# Patient Record
Sex: MALE | Race: WHITE | Employment: FULL TIME | URBAN - METROPOLITAN AREA
[De-identification: names, ages, dates, MRNs, and addresses within clinical notes are randomized per-mention and may not be internally consistent; named-entity substitution may affect disease eponyms.]

---

## 2019-10-21 ENCOUNTER — OFFICE VISIT (OUTPATIENT)
Dept: PRIMARY CARE CLINIC | Age: 24
End: 2019-10-21
Payer: COMMERCIAL

## 2019-10-21 VITALS
TEMPERATURE: 97.5 F | DIASTOLIC BLOOD PRESSURE: 76 MMHG | SYSTOLIC BLOOD PRESSURE: 117 MMHG | WEIGHT: 171.8 LBS | HEART RATE: 73 BPM | BODY MASS INDEX: 24.65 KG/M2

## 2019-10-21 DIAGNOSIS — J20.9 ACUTE BRONCHITIS, UNSPECIFIED ORGANISM: Primary | ICD-10-CM

## 2019-10-21 PROCEDURE — 99202 OFFICE O/P NEW SF 15 MIN: CPT | Performed by: INTERNAL MEDICINE

## 2019-10-21 RX ORDER — AZITHROMYCIN 250 MG/1
TABLET, FILM COATED ORAL
Qty: 1 PACKET | Refills: 0 | Status: SHIPPED | OUTPATIENT
Start: 2019-10-21 | End: 2020-11-28

## 2019-10-21 RX ORDER — METHYLPHENIDATE HYDROCHLORIDE 54 MG/1
54 TABLET ORAL EVERY MORNING
COMMUNITY
Start: 2019-10-21 | End: 2021-06-23 | Stop reason: SDUPTHER

## 2019-10-21 ASSESSMENT — PATIENT HEALTH QUESTIONNAIRE - PHQ9
1. LITTLE INTEREST OR PLEASURE IN DOING THINGS: 0
SUM OF ALL RESPONSES TO PHQ QUESTIONS 1-9: 0
SUM OF ALL RESPONSES TO PHQ9 QUESTIONS 1 & 2: 0
SUM OF ALL RESPONSES TO PHQ QUESTIONS 1-9: 0
2. FEELING DOWN, DEPRESSED OR HOPELESS: 0

## 2019-10-21 ASSESSMENT — ENCOUNTER SYMPTOMS: SORE THROAT: 1

## 2020-03-03 ENCOUNTER — HOSPITAL ENCOUNTER (EMERGENCY)
Age: 25
Discharge: HOME | End: 2020-03-03
Attending: EMERGENCY MEDICINE
Payer: COMMERCIAL

## 2020-03-03 ENCOUNTER — APPOINTMENT (OUTPATIENT)
Dept: GENERAL RADIOLOGY | Age: 25
End: 2020-03-03
Payer: COMMERCIAL

## 2020-03-03 VITALS
WEIGHT: 165 LBS | SYSTOLIC BLOOD PRESSURE: 120 MMHG | RESPIRATION RATE: 19 BRPM | HEART RATE: 98 BPM | TEMPERATURE: 97.6 F | DIASTOLIC BLOOD PRESSURE: 57 MMHG | OXYGEN SATURATION: 100 % | BODY MASS INDEX: 24.44 KG/M2 | HEIGHT: 69 IN

## 2020-03-03 LAB
-: NORMAL
ACETAMINOPHEN LEVEL: <5 UG/ML (ref 10–30)
ALBUMIN SERPL-MCNC: 4.5 G/DL (ref 3.5–5.2)
ALBUMIN/GLOBULIN RATIO: 1.4 (ref 1–2.5)
ALP BLD-CCNC: 68 U/L (ref 40–129)
ALT SERPL-CCNC: 26 U/L (ref 5–41)
AMORPHOUS: NORMAL
AMPHETAMINE SCREEN URINE: NEGATIVE
ANION GAP SERPL CALCULATED.3IONS-SCNC: 20 MMOL/L (ref 9–17)
AST SERPL-CCNC: 20 U/L
BACTERIA: NORMAL
BARBITURATE SCREEN URINE: NEGATIVE
BENZODIAZEPINE SCREEN, URINE: NEGATIVE
BILIRUB SERPL-MCNC: 1.08 MG/DL (ref 0.3–1.2)
BILIRUBIN URINE: NEGATIVE
BUN BLDV-MCNC: 18 MG/DL (ref 6–20)
BUN/CREAT BLD: ABNORMAL (ref 9–20)
BUPRENORPHINE URINE: NORMAL
CALCIUM SERPL-MCNC: 9.6 MG/DL (ref 8.6–10.4)
CANNABINOID SCREEN URINE: NEGATIVE
CASTS UA: NORMAL /LPF (ref 0–8)
CHLORIDE BLD-SCNC: 98 MMOL/L (ref 98–107)
CO2: 20 MMOL/L (ref 20–31)
COCAINE METABOLITE, URINE: NEGATIVE
COLOR: YELLOW
COMMENT UA: ABNORMAL
CREAT SERPL-MCNC: 0.93 MG/DL (ref 0.7–1.2)
CRYSTALS, UA: NORMAL /HPF
EPITHELIAL CELLS UA: NORMAL /HPF (ref 0–5)
ETHANOL PERCENT: <0.01 %
ETHANOL: <10 MG/DL
GFR AFRICAN AMERICAN: >60 ML/MIN
GFR NON-AFRICAN AMERICAN: >60 ML/MIN
GFR SERPL CREATININE-BSD FRML MDRD: ABNORMAL ML/MIN/{1.73_M2}
GFR SERPL CREATININE-BSD FRML MDRD: ABNORMAL ML/MIN/{1.73_M2}
GLUCOSE BLD-MCNC: 105 MG/DL (ref 75–110)
GLUCOSE BLD-MCNC: 126 MG/DL (ref 70–99)
GLUCOSE URINE: NEGATIVE
HCT VFR BLD CALC: 52.4 % (ref 40.7–50.3)
HEMOGLOBIN: 17.9 G/DL (ref 13–17)
KETONES, URINE: ABNORMAL
LEUKOCYTE ESTERASE, URINE: ABNORMAL
MCH RBC QN AUTO: 31 PG (ref 25.2–33.5)
MCHC RBC AUTO-ENTMCNC: 34.2 G/DL (ref 28.4–34.8)
MCV RBC AUTO: 90.7 FL (ref 82.6–102.9)
MDMA URINE: NORMAL
METHADONE SCREEN, URINE: NEGATIVE
METHAMPHETAMINE, URINE: NORMAL
MUCUS: NORMAL
MYOGLOBIN: 24 NG/ML (ref 28–72)
NITRITE, URINE: NEGATIVE
NRBC AUTOMATED: 0 PER 100 WBC
OPIATES, URINE: NEGATIVE
OTHER OBSERVATIONS UA: NORMAL
OXYCODONE SCREEN URINE: NEGATIVE
PDW BLD-RTO: 12.7 % (ref 11.8–14.4)
PH UA: 6 (ref 5–8)
PHENCYCLIDINE, URINE: NEGATIVE
PLATELET # BLD: 328 K/UL (ref 138–453)
PMV BLD AUTO: 9.9 FL (ref 8.1–13.5)
POTASSIUM SERPL-SCNC: 3.8 MMOL/L (ref 3.7–5.3)
PROPOXYPHENE, URINE: NORMAL
PROTEIN UA: NEGATIVE
RBC # BLD: 5.78 M/UL (ref 4.21–5.77)
RBC UA: NORMAL /HPF (ref 0–4)
RENAL EPITHELIAL, UA: NORMAL /HPF
SALICYLATE LEVEL: <1 MG/DL (ref 3–10)
SODIUM BLD-SCNC: 138 MMOL/L (ref 135–144)
SPECIFIC GRAVITY UA: 1.02 (ref 1–1.03)
TEST INFORMATION: NORMAL
TOTAL CK: 53 U/L (ref 39–308)
TOTAL PROTEIN: 7.7 G/DL (ref 6.4–8.3)
TOXIC TRICYCLIC SC,BLOOD: NEGATIVE
TRICHOMONAS: NORMAL
TRICYCLIC ANTIDEPRESSANTS, UR: NORMAL
TROPONIN INTERP: NORMAL
TROPONIN T: NORMAL NG/ML
TROPONIN, HIGH SENSITIVITY: <6 NG/L (ref 0–22)
TURBIDITY: CLEAR
URINE HGB: NEGATIVE
UROBILINOGEN, URINE: NORMAL
WBC # BLD: 15.6 K/UL (ref 3.5–11.3)
WBC UA: NORMAL /HPF (ref 0–5)
YEAST: NORMAL

## 2020-03-03 PROCEDURE — 82947 ASSAY GLUCOSE BLOOD QUANT: CPT

## 2020-03-03 PROCEDURE — 85027 COMPLETE CBC AUTOMATED: CPT

## 2020-03-03 PROCEDURE — 93005 ELECTROCARDIOGRAM TRACING: CPT | Performed by: GENERAL PRACTICE

## 2020-03-03 PROCEDURE — 80307 DRUG TEST PRSMV CHEM ANLYZR: CPT

## 2020-03-03 PROCEDURE — 6370000000 HC RX 637 (ALT 250 FOR IP): Performed by: STUDENT IN AN ORGANIZED HEALTH CARE EDUCATION/TRAINING PROGRAM

## 2020-03-03 PROCEDURE — 82550 ASSAY OF CK (CPK): CPT

## 2020-03-03 PROCEDURE — 81001 URINALYSIS AUTO W/SCOPE: CPT

## 2020-03-03 PROCEDURE — 84484 ASSAY OF TROPONIN QUANT: CPT

## 2020-03-03 PROCEDURE — G0480 DRUG TEST DEF 1-7 CLASSES: HCPCS

## 2020-03-03 PROCEDURE — 83874 ASSAY OF MYOGLOBIN: CPT

## 2020-03-03 PROCEDURE — 99284 EMERGENCY DEPT VISIT MOD MDM: CPT

## 2020-03-03 PROCEDURE — 71045 X-RAY EXAM CHEST 1 VIEW: CPT

## 2020-03-03 PROCEDURE — 2580000003 HC RX 258: Performed by: STUDENT IN AN ORGANIZED HEALTH CARE EDUCATION/TRAINING PROGRAM

## 2020-03-03 PROCEDURE — 2580000003 HC RX 258: Performed by: GENERAL PRACTICE

## 2020-03-03 PROCEDURE — 80053 COMPREHEN METABOLIC PANEL: CPT

## 2020-03-03 RX ORDER — 0.9 % SODIUM CHLORIDE 0.9 %
1000 INTRAVENOUS SOLUTION INTRAVENOUS ONCE
Status: COMPLETED | OUTPATIENT
Start: 2020-03-03 | End: 2020-03-03

## 2020-03-03 RX ORDER — ACETAMINOPHEN 500 MG
1000 TABLET ORAL ONCE
Status: COMPLETED | OUTPATIENT
Start: 2020-03-03 | End: 2020-03-03

## 2020-03-03 RX ADMIN — SODIUM CHLORIDE 1000 ML: 9 INJECTION, SOLUTION INTRAVENOUS at 18:28

## 2020-03-03 RX ADMIN — SODIUM CHLORIDE 1000 ML: 9 INJECTION, SOLUTION INTRAVENOUS at 20:30

## 2020-03-03 RX ADMIN — SODIUM CHLORIDE 1000 ML: 9 INJECTION, SOLUTION INTRAVENOUS at 17:34

## 2020-03-03 RX ADMIN — ACETAMINOPHEN 1000 MG: 500 TABLET ORAL at 20:03

## 2020-03-03 ASSESSMENT — PAIN DESCRIPTION - LOCATION: LOCATION: GENERALIZED

## 2020-03-03 ASSESSMENT — PAIN DESCRIPTION - DESCRIPTORS: DESCRIPTORS: NUMBNESS

## 2020-03-03 ASSESSMENT — ENCOUNTER SYMPTOMS
BLOOD IN STOOL: 0
ABDOMINAL PAIN: 1
BACK PAIN: 1
DIARRHEA: 1
COUGH: 0
VOMITING: 1
SHORTNESS OF BREATH: 1
SORE THROAT: 0
NAUSEA: 1

## 2020-03-03 ASSESSMENT — PAIN SCALES - GENERAL
PAINLEVEL_OUTOF10: 9
PAINLEVEL_OUTOF10: 7

## 2020-03-03 NOTE — ED PROVIDER NOTES
Not on file    Sexual activity: Not on file   Lifestyle    Physical activity:     Days per week: Not on file     Minutes per session: Not on file    Stress: Not on file   Relationships    Social connections:     Talks on phone: Not on file     Gets together: Not on file     Attends Buddhist service: Not on file     Active member of club or organization: Not on file     Attends meetings of clubs or organizations: Not on file     Relationship status: Not on file    Intimate partner violence:     Fear of current or ex partner: Not on file     Emotionally abused: Not on file     Physically abused: Not on file     Forced sexual activity: Not on file   Other Topics Concern    Not on file   Social History Narrative    Not on file       History reviewed. No pertinent family history. Allergies:  Patient has no known allergies. Home Medications:  Prior to Admission medications    Medication Sig Start Date End Date Taking? Authorizing Provider   methylphenidate (CONCERTA) 54 MG extended release tablet Take 54 mg by mouth every morning. 10/21/19 10/21/20  Historical Provider, MD   azithromycin (ZITHROMAX) 250 MG tablet Take 2 tabs (500 mg) on Day 1, and take 1 tab (250 mg) on days 2 through 5. 10/21/19   Hermila Obrien MD       REVIEW OF SYSTEMS    (2-9 systems for level 4, 10 or more for level 5)      Review of Systems   Constitutional: Positive for activity change and chills. Negative for fever. HENT: Negative for congestion, ear pain and sore throat. Respiratory: Positive for shortness of breath. Negative for cough. Gastrointestinal: Positive for abdominal pain, diarrhea, nausea and vomiting. Negative for blood in stool. Genitourinary: Negative for difficulty urinating, dysuria, enuresis and hematuria. Musculoskeletal: Positive for back pain and gait problem. Skin: Negative for rash and wound. Neurological: Positive for weakness and numbness.  Negative for tremors, seizures, speech difficulty TRICYCLIC SC,B    CK    Microscopic Urinalysis    POC CHEMISTRY (NA,K,ICA,GLU,CALC HCT/HGB,LACTATE,CREA,CL)    POC Glucose Fingerstick    EKG 12 Lead       MEDICATIONS ORDERED:  Orders Placed This Encounter   Medications    0.9 % sodium chloride bolus    0.9 % sodium chloride bolus    acetaminophen (TYLENOL) tablet 1,000 mg    0.9 % sodium chloride bolus       DDX: Electrolyte abnormality, Guyon Barré, viral illness, supplement overdose, rhabdomyolysis    DIAGNOSTIC RESULTS / EMERGENCY DEPARTMENT COURSE / MDM   :  Results for orders placed or performed during the hospital encounter of 03/03/20   CBC   Result Value Ref Range    WBC 15.6 (H) 3.5 - 11.3 k/uL    RBC 5.78 (H) 4.21 - 5.77 m/uL    Hemoglobin 17.9 (H) 13.0 - 17.0 g/dL    Hematocrit 52.4 (H) 40.7 - 50.3 %    MCV 90.7 82.6 - 102.9 fL    MCH 31.0 25.2 - 33.5 pg    MCHC 34.2 28.4 - 34.8 g/dL    RDW 12.7 11.8 - 14.4 %    Platelets 929 346 - 034 k/uL    MPV 9.9 8.1 - 13.5 fL    NRBC Automated 0.0 0.0 per 100 WBC   Comprehensive Metabolic Panel   Result Value Ref Range    Glucose 126 (H) 70 - 99 mg/dL    BUN 18 6 - 20 mg/dL    CREATININE 0.93 0.70 - 1.20 mg/dL    Bun/Cre Ratio NOT REPORTED 9 - 20    Calcium 9.6 8.6 - 10.4 mg/dL    Sodium 138 135 - 144 mmol/L    Potassium 3.8 3.7 - 5.3 mmol/L    Chloride 98 98 - 107 mmol/L    CO2 20 20 - 31 mmol/L    Anion Gap 20 (H) 9 - 17 mmol/L    Alkaline Phosphatase 68 40 - 129 U/L    ALT 26 5 - 41 U/L    AST 20 <40 U/L    Total Bilirubin 1.08 0.3 - 1.2 mg/dL    Total Protein 7.7 6.4 - 8.3 g/dL    Alb 4.5 3.5 - 5.2 g/dL    Albumin/Globulin Ratio 1.4 1.0 - 2.5    GFR Non-African American >60 >60 mL/min    GFR African American >60 >60 mL/min    GFR Comment          GFR Staging NOT REPORTED    Urinalysis   Result Value Ref Range    Color, UA YELLOW YELLOW    Turbidity UA CLEAR CLEAR    Glucose, Ur NEGATIVE NEGATIVE    Bilirubin Urine NEGATIVE NEGATIVE    Ketones, Urine TRACE (A) NEGATIVE    Specific [place], UA 1.022 1.005 - 1.030    Urine Hgb NEGATIVE NEGATIVE    pH, UA 6.0 5.0 - 8.0    Protein, UA NEGATIVE NEGATIVE    Urobilinogen, Urine Normal Normal    Nitrite, Urine NEGATIVE NEGATIVE    Leukocyte Esterase, Urine TRACE (A) NEGATIVE    Urinalysis Comments NOT REPORTED    MYOGLOBIN, SERUM   Result Value Ref Range    Myoglobin 24 (L) 28 - 72 ng/mL   Troponin   Result Value Ref Range    Troponin, High Sensitivity <6 0 - 22 ng/L    Troponin T NOT REPORTED <0.03 ng/mL    Troponin Interp NOT REPORTED    Urine Drug Screen   Result Value Ref Range    Amphetamine Screen, Ur NEGATIVE NEGATIVE    Barbiturate Screen, Ur NEGATIVE NEGATIVE    Benzodiazepine Screen, Urine NEGATIVE NEGATIVE    Cocaine Metabolite, Urine NEGATIVE NEGATIVE    Methadone Screen, Urine NEGATIVE NEGATIVE    Opiates, Urine NEGATIVE NEGATIVE    Phencyclidine, Urine NEGATIVE NEGATIVE    Propoxyphene, Urine NOT REPORTED NEGATIVE    Cannabinoid Scrn, Ur NEGATIVE NEGATIVE    Oxycodone Screen, Ur NEGATIVE NEGATIVE    Methamphetamine, Urine NOT REPORTED NEGATIVE    Tricyclic Antidepressants, Urine NOT REPORTED NEGATIVE    MDMA, Urine NOT REPORTED NEGATIVE    Buprenorphine Urine NOT REPORTED NEGATIVE    Test Information       Assay provides medical screening only. The absence of expected drug(s) and/or metabolite(s) may indicate diluted or adulterated urine, limitations of testing or timing of collection.    Acetaminophen Level   Result Value Ref Range    Acetaminophen Level <5 (L) 10 - 30 ug/mL   Ethanol   Result Value Ref Range    Ethanol <10 <10 mg/dL    Ethanol percent <7.514 <8.986 %   Salicylate   Result Value Ref Range    Salicylate Lvl <1 (L) 3 - 10 mg/dL   TOXIC TRICYCLIC SC,B   Result Value Ref Range    Toxic Tricyclic Sc,Blood NEGATIVE NEGATIVE   CK   Result Value Ref Range    Total CK 53 39 - 308 U/L   Microscopic Urinalysis   Result Value Ref Range    -          WBC, UA 0 TO 2 0 - 5 /HPF    RBC, UA 0 TO 2 0 - 4 /HPF    Casts UA  0 - 8 /LPF     2 TO 5 HYALINE Reference range defined for non-centrifuged specimen. Crystals, UA NOT REPORTED None /HPF    Epithelial Cells UA None 0 - 5 /HPF    Renal Epithelial, UA NOT REPORTED 0 /HPF    Bacteria, UA NOT REPORTED None    Mucus, UA NOT REPORTED None    Trichomonas, UA NOT REPORTED None    Amorphous, UA NOT REPORTED None    Other Observations UA NOT REPORTED NOT REQ. Yeast, UA NOT REPORTED None   POC Glucose Fingerstick   Result Value Ref Range    POC Glucose 105 75 - 110 mg/dL   EKG 12 Lead   Result Value Ref Range    Ventricular Rate 102 BPM    Atrial Rate 102 BPM    P-R Interval 120 ms    QRS Duration 102 ms    Q-T Interval 336 ms    QTc Calculation (Bazett) 437 ms    P Axis 58 degrees    R Axis 35 degrees    T Axis 12 degrees           RADIOLOGY:  None    EKG  EKG Interpretation    Interpreted by emergency department physician    Rhythm: sinus tachycardia  Rate: tachycardia  Axis: normal  Ectopy: none  Conduction: normal  ST Segments: no acute change  T Waves: normal  Q Waves: none    Clinical Impression: no acute changes    Fadi Hernandez      All EKG's are interpreted by the Emergency Department Physician who either signs or Co-signs this chart in the absence of a cardiologist.    EMERGENCY DEPARTMENT COURSE/IMPRESSION:  73-year-old male brought in for abdominal pain, difficulty walking, numbness and tingling all over, shortness of breath. On exam patient is diaphoretic, hands bilaterally are in a spasm, patient states he does workout every day and takes supplements, patient states he has been having coffee-ground emesis and dark stool which he is concerned he had blood in it, stool guaiac was performed and was negative. Patient started feeling slightly better after liter of fluid, patient redosed with another liter, heart rate responded decreased, patient denies any current nausea or vomiting.     Patient was signed out to oncoming resident pending fluid bolus and remainder of labs.    PROCEDURES:  None    CONSULTS:  None    CRITICAL CARE:  None    FINAL IMPRESSION      1.  Dehydration          DISPOSITION / PLAN     DISPOSITION        PATIENT REFERRED TO:  [facility]  [address] [facility] [address]  [phone]    If symptoms worsen    Coleman Arias MD  [address] [address] [address] [place] [address]  [phone]    Schedule an appointment as soon as possible for a visit in 2 days        DISCHARGE MEDICATIONS:  Discharge Medication List as of 3/3/2020 10:18 PM          Rachael Flores DO  Emergency Medicine Resident    (Please note that portions of thisnote were completed with a voice recognition program.  Efforts were made to edit the dictations but occasionally words are mis-transcribed.)     Rachael Flores DO  Resident  03/04/20 7103

## 2020-03-03 NOTE — ED NOTES
Pt to ED from walk in clinic. Pt states he has been having coffee ground emesis as well as dark stools. Pt has diffuse weakness and states his whole body feels numb. Pt was wheeled from triage d/t not being able to walk. Pt is had rapid, shallow breathing and is pale and diaphoretic. Pt denies recreational drug use. States he uses workout supplements including HGH. Dr. Carlynn Scheuermann at bedside to evaluate patient. Pt placed on full cardiac monitor, IV established, Labs drawn. Awaiting further orders.       Den Penaloza, VASYL  03/03/20 [address], VASYL  03/03/20 8936

## 2020-03-04 LAB
EKG ATRIAL RATE: 102 BPM
EKG P AXIS: 58 DEGREES
EKG P-R INTERVAL: 120 MS
EKG Q-T INTERVAL: 336 MS
EKG QRS DURATION: 102 MS
EKG QTC CALCULATION (BAZETT): 437 MS
EKG R AXIS: 35 DEGREES
EKG T AXIS: 12 DEGREES
EKG VENTRICULAR RATE: 102 BPM

## 2020-03-04 PROCEDURE — 93010 ELECTROCARDIOGRAM REPORT: CPT | Performed by: INTERNAL MEDICINE

## 2020-03-04 NOTE — ED PROVIDER NOTES
sob TECHNOLOGIST PROVIDED HISTORY: sob Reason for Exam: upr,weakness,emesis FINDINGS: The lungs are without acute focal process. There is no effusion or pneumothorax. The cardiomediastinal silhouette is without acute process. The osseous structures are without acute process. No acute process. RECENT VITALS:     Temp: 97.6 °F (36.4 °C),  Pulse: 98, Resp: 19, BP: (!) 120/57, SpO2: 100 %    This patient is a 23 y.o. Male with diffuse myalgias, dehydration, generalized weakness. Patient reports significant exertion with working out recently. Patient appears to be dehydrated on lab work. Patient received 3 L normal saline, tolerated p.o. intake, vital signs normalized patient reported improvement of symptoms. Patient will need for discharge home, discussed return precautions with patient as well as patient's mother including worsening diffuse myalgias, darkening of urine, fevers chills, or worsening weakness. Ck was obtained and normal range. OUTSTANDING TASKS / RECOMMENDATIONS:    1.       FINAL IMPRESSION:     1. Dehydration        DISPOSITION:         DISPOSITION:  []  Discharge   []  Transfer -    []  Admission -     []  Against Medical Advice   []  Eloped   FOLLOW-UP: [facility]  [address] [facility]  [phone]    If symptoms worsen    Stephani Roman MD  [facility] [address]  [phone]    Schedule an appointment as soon as possible for a visit in 2 days       DISCHARGE MEDICATIONS: New Prescriptions    No medications on file          Ferdinand Zhu DO  Emergency Medicine Resident  [place], [place]  03/03/20 9414

## 2020-11-28 ENCOUNTER — OFFICE VISIT (OUTPATIENT)
Dept: FAMILY MEDICINE CLINIC | Age: 25
End: 2020-11-28
Payer: COMMERCIAL

## 2020-11-28 VITALS
BODY MASS INDEX: 23.7 KG/M2 | HEIGHT: 69 IN | OXYGEN SATURATION: 99 % | TEMPERATURE: 98.8 F | SYSTOLIC BLOOD PRESSURE: 123 MMHG | DIASTOLIC BLOOD PRESSURE: 83 MMHG | HEART RATE: 72 BPM | WEIGHT: 160 LBS

## 2020-11-28 PROCEDURE — 90715 TDAP VACCINE 7 YRS/> IM: CPT | Performed by: NURSE PRACTITIONER

## 2020-11-28 PROCEDURE — 90471 IMMUNIZATION ADMIN: CPT | Performed by: NURSE PRACTITIONER

## 2020-11-28 PROCEDURE — 99213 OFFICE O/P EST LOW 20 MIN: CPT | Performed by: NURSE PRACTITIONER

## 2020-11-28 RX ORDER — DOXYCYCLINE HYCLATE 100 MG/1
100 CAPSULE ORAL 2 TIMES DAILY
Qty: 14 CAPSULE | Refills: 0 | Status: SHIPPED | OUTPATIENT
Start: 2020-11-28 | End: 2020-12-05

## 2020-11-28 RX ORDER — DEXMETHYLPHENIDATE HYDROCHLORIDE 10 MG/1
20 TABLET ORAL
COMMUNITY
Start: 2020-10-22 | End: 2021-08-16 | Stop reason: SDUPTHER

## 2020-11-28 ASSESSMENT — PATIENT HEALTH QUESTIONNAIRE - PHQ9
SUM OF ALL RESPONSES TO PHQ QUESTIONS 1-9: 0
2. FEELING DOWN, DEPRESSED OR HOPELESS: 0
1. LITTLE INTEREST OR PLEASURE IN DOING THINGS: 0
SUM OF ALL RESPONSES TO PHQ QUESTIONS 1-9: 0
SUM OF ALL RESPONSES TO PHQ9 QUESTIONS 1 & 2: 0
SUM OF ALL RESPONSES TO PHQ QUESTIONS 1-9: 0

## 2020-11-28 ASSESSMENT — ENCOUNTER SYMPTOMS
SHORTNESS OF BREATH: 0
COUGH: 0
WHEEZING: 0
CHEST TIGHTNESS: 0
RESPIRATORY NEGATIVE: 1

## 2020-11-28 NOTE — PROGRESS NOTES
After obtaining consent, and per orders of [facility], injection of Tdap given in Left deltoid by Oscar Gray. Patient instructed to remain in clinic for 20 minutes afterwards, and to report any adverse reaction to me immediately.

## 2020-11-28 NOTE — PROGRESS NOTES
[address] [facility]  [address] [address]  Dept: [phone]  Dept Fax: [phone]     Renetta Mendez is a 23 y.o. male who presents to the urgent care today for his medicalconditions/complaints as noted below. Renetta Mendez is c/o of Other (stepped on nail two days ago )    HPI:      Wound Check   He was originally treated 2 to 3 days ago. Previous treatment included wound cleansing or irrigation. Maximum temperature: afebrile. There has been no drainage from the wound. There is no redness present. The swelling has not changed. The pain has not changed. Step on a nail 2-3 days ago and states that he would like to have it checked. Does not believe he is up to date on his tetanus shot. No past medical history on file. Current Outpatient Medications   Medication Sig Dispense Refill    doxycycline hyclate (VIBRAMYCIN) 100 MG capsule Take 1 capsule by mouth 2 times daily for 7 days 14 capsule 0    dexmethylphenidate (FOCALIN) 10 MG tablet       methylphenidate (CONCERTA) 54 MG extended release tablet Take 54 mg by mouth every morning. No current facility-administered medications for this visit. No Known Allergies    Reviewed PMH, SH, and FH with the patient and updated. Subjective:      Review of Systems   Constitutional: Negative for chills, fatigue and fever. Respiratory: Negative. Negative for cough, chest tightness, shortness of breath and wheezing. Cardiovascular: Negative. Negative for chest pain. Musculoskeletal: Positive for arthralgias (left foot puncture wound) and gait problem (pain when bearing weight on left foot). Skin: Positive for wound (puncture wound, left foot). Negative for rash. All other systems reviewed and are negative. Objective:      Physical Exam  Vitals signs and nursing note reviewed. Constitutional:       General: He is not in acute distress.      Appearance: He is well-developed. He is not diaphoretic. HENT:      Head: Normocephalic and atraumatic. Cardiovascular:      Rate and Rhythm: Normal rate and regular rhythm. Heart sounds: Normal heart sounds. No murmur. Pulmonary:      Effort: Pulmonary effort is normal. No respiratory distress. Breath sounds: Normal breath sounds. No wheezing. Musculoskeletal:      Left foot: Normal range of motion. Tenderness and swelling (trace, over puncture site) present. No bony tenderness. Feet:    Skin:     General: Skin is warm and dry. Findings: Wound (puncture wound left foot) present. Neurological:      Mental Status: He is alert. /83 (Site: Left Upper Arm, Position: Sitting, Cuff Size: Medium Adult)   Pulse 72   Temp 98.8 °F (37.1 °C) (Temporal)   Ht 5' 9\" (1.753 m)   Wt 160 lb (72.6 kg)   SpO2 99%   BMI 23.63 kg/m²     Assessment:       Diagnosis Orders   1. Puncture wound  Tdap (age 6y and older) IM (BOOSTRIX)    doxycycline hyclate (VIBRAMYCIN) 100 MG capsule     Plan:      Offered XR, patient declined. Patient instructed to complete antibiotic prescription fully. Tetanus immunization found to be necessary at this time. Td booster administered in the office. Patient tolerated well. May use Motrin/Tylenol for fever/pain. Educational materials provided on AVS.  Follow up if symptoms do not improve/worsen. Orders Placed This Encounter   Medications    doxycycline hyclate (VIBRAMYCIN) 100 MG capsule     Sig: Take 1 capsule by mouth 2 times daily for 7 days     Dispense:  14 capsule     Refill:  0        Patient given educational materials - see patient instructions. Discussed use, benefit, and side effects of prescribed medications. All patientquestions answered. Pt voiced understanding.     Electronically signed by BREE Hidalgo CNP on 11/28/2020at 10:51 AM

## 2021-04-06 ENCOUNTER — OFFICE VISIT (OUTPATIENT)
Dept: PRIMARY CARE CLINIC | Age: 26
End: 2021-04-06
Payer: COMMERCIAL

## 2021-04-06 VITALS
SYSTOLIC BLOOD PRESSURE: 132 MMHG | DIASTOLIC BLOOD PRESSURE: 84 MMHG | HEART RATE: 84 BPM | BODY MASS INDEX: 25.84 KG/M2 | TEMPERATURE: 97.9 F | WEIGHT: 175 LBS | OXYGEN SATURATION: 98 %

## 2021-04-06 DIAGNOSIS — F90.0 ADHD (ATTENTION DEFICIT HYPERACTIVITY DISORDER), INATTENTIVE TYPE: ICD-10-CM

## 2021-04-06 PROCEDURE — 99203 OFFICE O/P NEW LOW 30 MIN: CPT | Performed by: NURSE PRACTITIONER

## 2021-04-06 SDOH — ECONOMIC STABILITY: INCOME INSECURITY: HOW HARD IS IT FOR YOU TO PAY FOR THE VERY BASICS LIKE FOOD, HOUSING, MEDICAL CARE, AND HEATING?: NOT HARD AT ALL

## 2021-04-06 SDOH — ECONOMIC STABILITY: FOOD INSECURITY: WITHIN THE PAST 12 MONTHS, THE FOOD YOU BOUGHT JUST DIDN'T LAST AND YOU DIDN'T HAVE MONEY TO GET MORE.: NEVER TRUE

## 2021-04-06 ASSESSMENT — PATIENT HEALTH QUESTIONNAIRE - PHQ9
SUM OF ALL RESPONSES TO PHQ QUESTIONS 1-9: 0
2. FEELING DOWN, DEPRESSED OR HOPELESS: 0
SUM OF ALL RESPONSES TO PHQ9 QUESTIONS 1 & 2: 0
1. LITTLE INTEREST OR PLEASURE IN DOING THINGS: 0
SUM OF ALL RESPONSES TO PHQ QUESTIONS 1-9: 0

## 2021-04-06 ASSESSMENT — ENCOUNTER SYMPTOMS
BLOOD IN STOOL: 0
DIARRHEA: 0
VOMITING: 0
WHEEZING: 0
SHORTNESS OF BREATH: 0
TROUBLE SWALLOWING: 0
ABDOMINAL PAIN: 0

## 2021-04-06 NOTE — PROGRESS NOTES
neck pain. Skin: Negative for rash. Neurological: Negative for dizziness, seizures, syncope, numbness and headaches. Psychiatric/Behavioral: Positive for decreased concentration. Negative for agitation, dysphoric mood, sleep disturbance and suicidal ideas. The patient is not nervous/anxious. Prior to Visit Medications    Medication Sig Taking? Authorizing Provider   dexmethylphenidate (FOCALIN) 10 MG tablet 20 mg. Yes Historical Provider, MD   methylphenidate (CONCERTA) 54 MG extended release tablet Take 54 mg by mouth every morning. Yes Historical Provider, MD        Social History     Tobacco Use    Smoking status: Never Smoker    Smokeless tobacco: Never Used   Substance Use Topics    Alcohol use: Not on file        Vitals:    04/06/21 1029   BP: 132/84   Site: Left Upper Arm   Position: Sitting   Cuff Size: Medium Adult   Pulse: 84   Temp: 97.9 °F (36.6 °C)   SpO2: 98%   Weight: 175 lb (79.4 kg)     Estimated body mass index is 25.84 kg/m² as calculated from the following:    Height as of 11/28/20: 5' 9\" (1.753 m). Weight as of this encounter: 175 lb (79.4 kg). DIAGNOSTIC FINDINGS:  CBC:  Lab Results   Component Value Date    WBC 15.6 03/03/2020    HGB 17.9 03/03/2020     03/03/2020     01/11/2012       BMP:    Lab Results   Component Value Date     03/03/2020    K 3.8 03/03/2020    CL 98 03/03/2020    CO2 20 03/03/2020    BUN 18 03/03/2020    CREATININE 0.93 03/03/2020    GLUCOSE 126 03/03/2020    GLUCOSE 101 01/11/2012       HEMOGLOBIN A1C: No results found for: LABA1C    FASTING LIPID PANEL:No results found for: CHOL, HDL, TRIG    Physical Exam  Vitals signs and nursing note reviewed. Constitutional:       General: He is not in acute distress. Appearance: Normal appearance. He is well-developed. He is not ill-appearing. HENT:      Head: Normocephalic. Eyes:      General: No scleral icterus. Pupils: Pupils are equal, round, and reactive to light.    Neck: Return in about 3 months (around 7/6/2021) for ADHD. An electronic signature was used to authenticate this note.     --Hudson Mckoy, BREE - CNP on 4/6/2021 at 11:53 AM

## 2021-05-28 ENCOUNTER — OFFICE VISIT (OUTPATIENT)
Dept: PRIMARY CARE CLINIC | Age: 26
End: 2021-05-28
Payer: COMMERCIAL

## 2021-05-28 VITALS
TEMPERATURE: 98.4 F | HEART RATE: 74 BPM | OXYGEN SATURATION: 97 % | SYSTOLIC BLOOD PRESSURE: 123 MMHG | DIASTOLIC BLOOD PRESSURE: 76 MMHG

## 2021-05-28 DIAGNOSIS — L55.9 SUNBURN: Primary | ICD-10-CM

## 2021-05-28 PROCEDURE — 99213 OFFICE O/P EST LOW 20 MIN: CPT | Performed by: INTERNAL MEDICINE

## 2021-05-28 RX ORDER — MOMETASONE FUROATE 1 MG/G
CREAM TOPICAL
Qty: 45 G | Refills: 0 | Status: SHIPPED | OUTPATIENT
Start: 2021-05-28 | End: 2022-08-29 | Stop reason: SDUPTHER

## 2021-05-28 ASSESSMENT — ENCOUNTER SYMPTOMS: BURN: 1
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
## 2021-05-28 NOTE — PROGRESS NOTES
Visit Information    Have you changed or started any medications since your last visit including any over-the-counter medicines, vitamins, or herbal medicines? no   Are you having any side effects from any of your medications? -  no  Have you stopped taking any of your medications? Is so, why? -  no    Have you seen any other physician or provider since your last visit? No  Have you had any other diagnostic tests since your last visit? No  Have you been seen in the emergency room and/or had an admission to a hospital since we last saw you? No  Have you had your routine dental cleaning in the past 6 months? no    Have you activated your [employer] account? If not, what are your barriers?  No:      Patient Care Team:  BREE Sheets CNP as PCP - General (Family Medicine)  BREE Sheets CNP as PCP - [facility] Provider    Medical History Review  Past Medical, Family, and Social History reviewed and does not contribute to the patient presenting condition    Health Maintenance   Topic Date Due    Hepatitis C screen  Never done    Varicella vaccine (1 of 2 - 2-dose childhood series) Never done    HPV vaccine (1 - Male 2-dose series) Never done    COVID-19 Vaccine (1) Never done    HIV screen  Never done    Flu vaccine (Season Ended) 09/01/2021    DTaP/Tdap/Td vaccine (2 - Td) 11/28/2030    Hepatitis A vaccine  Aged Out    Hepatitis B vaccine  Aged Out    Hib vaccine  Aged Out    Meningococcal (ACWY) vaccine  Aged Out    Pneumococcal 0-64 years Vaccine  Aged Out

## 2021-06-23 ENCOUNTER — OFFICE VISIT (OUTPATIENT)
Dept: PRIMARY CARE CLINIC | Age: 26
End: 2021-06-23
Payer: COMMERCIAL

## 2021-06-23 VITALS
TEMPERATURE: 98.4 F | OXYGEN SATURATION: 95 % | DIASTOLIC BLOOD PRESSURE: 74 MMHG | SYSTOLIC BLOOD PRESSURE: 124 MMHG | WEIGHT: 174 LBS | HEART RATE: 89 BPM | BODY MASS INDEX: 25.7 KG/M2

## 2021-06-23 DIAGNOSIS — F90.0 ADHD (ATTENTION DEFICIT HYPERACTIVITY DISORDER), INATTENTIVE TYPE: Primary | ICD-10-CM

## 2021-06-23 PROCEDURE — 99213 OFFICE O/P EST LOW 20 MIN: CPT | Performed by: NURSE PRACTITIONER

## 2021-06-23 RX ORDER — METHYLPHENIDATE HYDROCHLORIDE 54 MG/1
54 TABLET ORAL EVERY MORNING
Qty: 30 TABLET | Refills: 0 | Status: SHIPPED | OUTPATIENT
Start: 2021-06-23 | End: 2021-08-16 | Stop reason: SDUPTHER

## 2021-06-23 ASSESSMENT — PATIENT HEALTH QUESTIONNAIRE - PHQ9
SUM OF ALL RESPONSES TO PHQ QUESTIONS 1-9: 0
2. FEELING DOWN, DEPRESSED OR HOPELESS: 0
1. LITTLE INTEREST OR PLEASURE IN DOING THINGS: 0
SUM OF ALL RESPONSES TO PHQ9 QUESTIONS 1 & 2: 0

## 2021-06-23 ASSESSMENT — ENCOUNTER SYMPTOMS
ABDOMINAL PAIN: 0
DIARRHEA: 0
VOMITING: 0
TROUBLE SWALLOWING: 0
WHEEZING: 0
BLOOD IN STOOL: 0
SHORTNESS OF BREATH: 0

## 2021-06-23 NOTE — PROGRESS NOTES
[facility]  [address] [facility] [address]  Dept: [phone]  Dept Fax: [phone]    Patient Care Team:  BREE Mcdermott CNP as PCP - General (Family Medicine)  BREE Mcdermott CNP as PCP - [facility] Empaneled Provider    2021     Crys Noguera (:  1995)is a 27 y.o. male, here for evaluation of the following medical concerns:   Chief Complaint   Patient presents with    3 Month Follow-Up     ADHD       Crys Noguera is a 79-year-old male here today for ADHD f/u. He endorses a past medical history of ADHD only. Taking Concerta daily and Focalin used as needed for study purposes. 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 He is still also working at [employer]. Diagnosed as a Freshman in high school, [address] [place] [address] sent him to their counselor and was sent to a [place].  He admits he always had trouble in school, difficulty with grades. Is night and day on meds, trouble focusing. Also admits to lack ambition when meds wear off at the end of the day. He denies any palpitations or dizziness. He states he will have nausea if he does not eat with his medication. No difficulty falling asleep. .    Review of Systems   Constitutional: Negative for appetite change, fever and unexpected weight change. HENT: Negative for hearing loss and trouble swallowing. Eyes: Negative for visual disturbance. Respiratory: Negative for shortness of breath and wheezing. Cardiovascular: Negative for chest pain and palpitations. Gastrointestinal: Negative for abdominal pain, blood in stool, diarrhea and vomiting. Endocrine: Negative for polydipsia and polyuria. Genitourinary: Negative for difficulty urinating, frequency, hematuria and testicular pain. Musculoskeletal: Negative for myalgias and neck pain. Skin: Negative for rash.    Neurological: Negative for dizziness, seizures, syncope, numbness and headaches. Psychiatric/Behavioral: Positive for decreased concentration. Negative for agitation, dysphoric mood, sleep disturbance and suicidal ideas. The patient is not nervous/anxious. Prior to Visit Medications    Medication Sig Taking? Authorizing Provider   methylphenidate (CONCERTA) 54 MG extended release tablet Take 1 tablet by mouth every morning. Yes BREE Terrazas CNP   mometasone (ELOCON) 0.1 % cream Apply topically daily. Yes Ana Pa MD   dexmethylphenidate (FOCALIN) 10 MG tablet 20 mg. Yes Historical Provider, MD        Social History     Tobacco Use    Smoking status: Never Smoker    Smokeless tobacco: Never Used   Substance Use Topics    Alcohol use: Not on file        Vitals:    06/23/21 1511   BP: 124/74   Site: Left Upper Arm   Position: Sitting   Cuff Size: Medium Adult   Pulse: 89   Temp: 98.4 °F (36.9 °C)   TempSrc: Temporal   SpO2: 95%   Weight: 174 lb (78.9 kg)     Estimated body mass index is 25.7 kg/m² as calculated from the following:    Height as of 11/28/20: 5' 9\" (1.753 m). Weight as of this encounter: 174 lb (78.9 kg). DIAGNOSTIC FINDINGS:  CBC:  Lab Results   Component Value Date    WBC 15.6 03/03/2020    HGB 17.9 03/03/2020     03/03/2020     01/11/2012       BMP:    Lab Results   Component Value Date     03/03/2020    K 3.8 03/03/2020    CL 98 03/03/2020    CO2 20 03/03/2020    BUN 18 03/03/2020    CREATININE 0.93 03/03/2020    GLUCOSE 126 03/03/2020    GLUCOSE 101 01/11/2012       HEMOGLOBIN A1C: No results found for: LABA1C    FASTING LIPID PANEL:No results found for: CHOL, HDL, TRIG    Physical Exam  Vitals and nursing note reviewed. Constitutional:       General: He is not in acute distress. Appearance: Normal appearance. He is well-developed. He is not ill-appearing. HENT:      Head: Normocephalic. Eyes:      General: No scleral icterus.      Pupils: Pupils are equal, round, and reactive to light. Cardiovascular:      Rate and Rhythm: Normal rate and regular rhythm. Heart sounds: No murmur heard. Pulmonary:      Effort: Pulmonary effort is normal.      Breath sounds: Normal breath sounds. Abdominal:      General: Abdomen is flat. Palpations: Abdomen is soft. Musculoskeletal:      Cervical back: Normal range of motion and neck supple. Skin:     General: Skin is warm and dry. Neurological:      General: No focal deficit present. Mental Status: He is alert and oriented to person, place, and time. Coordination: Coordination normal.   Psychiatric:         Behavior: Behavior normal. Behavior is cooperative. Thought Content: Thought content normal.         Judgment: Judgment normal.         ASSESSMENT     Diagnosis Orders   1. ADHD (attention deficit hyperactivity disorder), inattentive type  methylphenidate (CONCERTA) 54 MG extended release tablet          PLAN:  Orders Placed This Encounter   Medications    methylphenidate (CONCERTA) 54 MG extended release tablet     Sig: Take 1 tablet by mouth every morning. Dispense:  30 tablet     Refill:  0         1. OARRS report reviewed, no concerns with aberrant use. Main stable, blood pressure at goal.  No tachycardia. He may continue methylphenidate daily. Currently denies any need for refills of Focalin, still has 10 tablets left from last prescription    FOLLOW UP AND INSTRUCTIONS:  Return in about 3 months (around 9/23/2021) for ADHD. · Merrick Canas received counseling on the following healthy behaviors:medication adherence    · Discussed use, benefit, and side effects of prescribed medications. Barriers to  medication compliance addressed. All patient questions answered. Pt  verbalized understanding of all instructions given.     · Patient given educational materials - see patient instructions      · Patient advised to contact scheduling offices for any referrals or imaging orders  placed today if they have not been contacted in 48 hours. Return in about 3 months (around 9/23/2021) for ADHD. An electronic signature was used to authenticate this note. --BREE Thibodeaux CNP on 6/23/2021 at 4:41 PM  Visit Information    Have you changed or started any medications since your last visit including any over-the-counter medicines, vitamins, or herbal medicines? no   Are you having any side effects from any of your medications? -  no  Have you stopped taking any of your medications? Is so, why? -  no    Have you seen any other physician or provider since your last visit? No  Have you had any other diagnostic tests since your last visit? No  Have you been seen in the emergency room and/or had an admission to a hospital since we last saw you? No  Have you had your routine dental cleaning in the past 6 months? no    Have you activated your [employer] account? If not, what are your barriers?  No: declined     Patient Care Team:  BREE Thibodeaux CNP as PCP - General (Family Medicine)  BREE Thibodeaux CNP as PCP - [facility] Empaneled Provider    Medical History Review  Past Medical, Family, and Social History reviewed and does not contribute to the patient presenting condition    Health Maintenance   Topic Date Due    Hepatitis C screen  Never done    Varicella vaccine (1 of 2 - 2-dose childhood series) Never done    HPV vaccine (1 - Male 2-dose series) Never done    HIV screen  Never done    Flu vaccine (Season Ended) 09/01/2021    DTaP/Tdap/Td vaccine (2 - Td or Tdap) 11/28/2030    COVID-19 Vaccine  Completed    Hepatitis A vaccine  Aged Out    Hepatitis B vaccine  Aged Out    Hib vaccine  Aged Out    Meningococcal (ACWY) vaccine  Aged Out    Pneumococcal 0-64 years Vaccine  Aged Out

## 2021-08-16 ENCOUNTER — TELEPHONE (OUTPATIENT)
Dept: PRIMARY CARE CLINIC | Age: 26
End: 2021-08-16

## 2021-08-16 DIAGNOSIS — F90.0 ADHD (ATTENTION DEFICIT HYPERACTIVITY DISORDER), INATTENTIVE TYPE: ICD-10-CM

## 2021-08-16 RX ORDER — DEXMETHYLPHENIDATE HYDROCHLORIDE 10 MG/1
10 TABLET ORAL DAILY PRN
Qty: 20 TABLET | Refills: 0 | Status: SHIPPED | OUTPATIENT
Start: 2021-08-16 | End: 2022-05-31 | Stop reason: SDUPTHER

## 2021-08-16 RX ORDER — METHYLPHENIDATE HYDROCHLORIDE 54 MG/1
54 TABLET ORAL EVERY MORNING
Qty: 30 TABLET | Refills: 0 | Status: SHIPPED | OUTPATIENT
Start: 2021-08-16 | End: 2021-09-29 | Stop reason: SDUPTHER

## 2021-08-16 NOTE — TELEPHONE ENCOUNTER
Patient is requesting a med refill on methylphenidate 54 mg and dexmethylphenidate 10 mg, patient has upcoming appt on 9/29 with pcp  Health Maintenance   Topic Date Due    Hepatitis C screen  Never done    Varicella vaccine (1 of 2 - 2-dose childhood series) Never done    HPV vaccine (1 - Male 2-dose series) Never done    HIV screen  Never done    Flu vaccine (1) 09/01/2021    DTaP/Tdap/Td vaccine (2 - Td or Tdap) 11/28/2030    COVID-19 Vaccine  Completed    Hepatitis A vaccine  Aged Out    Hepatitis B vaccine  Aged Out    Hib vaccine  Aged Out    Meningococcal (ACWY) vaccine  Aged Out    Pneumococcal 0-64 years Vaccine  Aged Out             (applicable per patient's age: Cancer Screenings, Depression Screening, Fall Risk Screening, Immunizations)    AST (U/L)   Date Value   03/03/2020 20     ALT (U/L)   Date Value   03/03/2020 26     BUN (mg/dL)   Date Value   03/03/2020 18      (goal A1C is < 7)   (goal LDL is <100) need 30-50% reduction from baseline     BP Readings from Last 3 Encounters:   06/23/21 124/74   05/28/21 123/76   04/06/21 132/84    (goal /80)      All Future Testing planned in [facility]:      Next Visit Date:  Future Appointments   Date Time Provider Alejandro Keith   9/29/2021  3:15 PM Emeli Le, APRN - [address] [facility]            Patient Active Problem List:     ADHD (attention deficit hyperactivity disorder), inattentive type

## 2021-09-29 ENCOUNTER — OFFICE VISIT (OUTPATIENT)
Dept: PRIMARY CARE CLINIC | Age: 26
End: 2021-09-29
Payer: COMMERCIAL

## 2021-09-29 VITALS
HEART RATE: 88 BPM | OXYGEN SATURATION: 99 % | WEIGHT: 172 LBS | DIASTOLIC BLOOD PRESSURE: 89 MMHG | BODY MASS INDEX: 25.4 KG/M2 | TEMPERATURE: 98 F | SYSTOLIC BLOOD PRESSURE: 128 MMHG

## 2021-09-29 DIAGNOSIS — J45.20 MILD INTERMITTENT REACTIVE AIRWAY DISEASE WITHOUT COMPLICATION: ICD-10-CM

## 2021-09-29 DIAGNOSIS — Z23 FLU VACCINE NEED: ICD-10-CM

## 2021-09-29 DIAGNOSIS — F90.0 ADHD (ATTENTION DEFICIT HYPERACTIVITY DISORDER), INATTENTIVE TYPE: Primary | ICD-10-CM

## 2021-09-29 PROCEDURE — 90471 IMMUNIZATION ADMIN: CPT | Performed by: NURSE PRACTITIONER

## 2021-09-29 PROCEDURE — 90674 CCIIV4 VAC NO PRSV 0.5 ML IM: CPT | Performed by: NURSE PRACTITIONER

## 2021-09-29 PROCEDURE — 99213 OFFICE O/P EST LOW 20 MIN: CPT | Performed by: NURSE PRACTITIONER

## 2021-09-29 RX ORDER — METHYLPHENIDATE HYDROCHLORIDE 54 MG/1
54 TABLET ORAL EVERY MORNING
Qty: 30 TABLET | Refills: 0 | Status: SHIPPED | OUTPATIENT
Start: 2021-09-29 | End: 2021-11-09 | Stop reason: SDUPTHER

## 2021-09-29 RX ORDER — MULTIVITAMIN
TABLET ORAL
COMMUNITY

## 2021-09-29 RX ORDER — ALBUTEROL SULFATE 90 UG/1
2 AEROSOL, METERED RESPIRATORY (INHALATION) 4 TIMES DAILY PRN
Qty: 18 G | Refills: 0 | Status: SHIPPED | OUTPATIENT
Start: 2021-09-29

## 2021-09-29 ASSESSMENT — ENCOUNTER SYMPTOMS
VOMITING: 0
WHEEZING: 0
BLOOD IN STOOL: 0
SHORTNESS OF BREATH: 0
DIARRHEA: 0
ABDOMINAL PAIN: 0
TROUBLE SWALLOWING: 0

## 2021-09-29 NOTE — PROGRESS NOTES
[facility]  [address] [facility] [address]  Dept: [phone]  Dept Fax: [phone]    Patient Care Team:  BREE Muñoz CNP as PCP - General (Family Medicine)  BREE Muñoz CNP as PCP - [facility] Empaneled Provider    2021     Juan Jose Muniz (:  1995)is a 27 y.o. male, here for evaluation of the following medical concerns:   Chief Complaint   Patient presents with    3 Month Follow-Up     ADHD    Medication Refill     [address] Maintenance     Flu vaccine       Juan Jose Muniz is a 53-year-old male here today for ADHD f/u. He endorses a past medical history of ADHD only. Taking Concerta daily and Focalin used as needed for focusing purposes. Will use the focalin if driving or need for a short shift. 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 He is still also working at [employer]. Diagnosed as a Freshman in high school, [address] [place] [address] sent him to their counselor and was sent to a [place].  He admits he always had trouble in school, difficulty with grades. Is night and day on meds, trouble focusing. Also admits to lack ambition when meds wear off at the end of the day. He denies any palpitations or dizziness. He states he will have nausea if he does not eat with his medication. No difficulty falling asleep unless taken late in the day. No weight loss or appetite change. .    Review of Systems   Constitutional: Negative for appetite change, fever and unexpected weight change. HENT: Negative for hearing loss and trouble swallowing. Eyes: Negative for visual disturbance. Respiratory: Negative for shortness of breath and wheezing. Cardiovascular: Negative for chest pain and palpitations. Gastrointestinal: Negative for abdominal pain, blood in stool, diarrhea and vomiting. CO2 20 03/03/2020    BUN 18 03/03/2020    CREATININE 0.93 03/03/2020    GLUCOSE 126 03/03/2020    GLUCOSE 101 01/11/2012       HEMOGLOBIN A1C: No results found for: LABA1C    FASTING LIPID PANEL:No results found for: CHOL, HDL, TRIG    Physical Exam  Vitals and nursing note reviewed. Constitutional:       General: He is not in acute distress. Appearance: Normal appearance. He is well-developed. He is not ill-appearing. HENT:      Head: Normocephalic. Eyes:      General: No scleral icterus. Pupils: Pupils are equal, round, and reactive to light. Cardiovascular:      Rate and Rhythm: Normal rate and regular rhythm. Heart sounds: No murmur heard. Pulmonary:      Effort: Pulmonary effort is normal.      Breath sounds: Normal breath sounds. Abdominal:      General: Abdomen is flat. Palpations: Abdomen is soft. Musculoskeletal:      Cervical back: Normal range of motion and neck supple. Skin:     General: Skin is warm and dry. Neurological:      General: No focal deficit present. Mental Status: He is alert and oriented to person, place, and time. Coordination: Coordination normal.   Psychiatric:         Behavior: Behavior normal. Behavior is cooperative. Thought Content: Thought content normal.         Judgment: Judgment normal.         ASSESSMENT     Diagnosis Orders   1. ADHD (attention deficit hyperactivity disorder), inattentive type  methylphenidate (CONCERTA) 54 MG extended release tablet   2. Flu vaccine need  INFLUENZA, MDCK QUADV, 2 YRS AND OLDER, IM, PF, PREFILL SYR OR SDV, 0.5ML (FLUCELVAX QUADV, PF)   3.  Mild intermittent reactive airway disease without complication  albuterol sulfate HFA (VENTOLIN HFA) 108 (90 Base) MCG/ACT inhaler          PLAN:  Orders Placed This Encounter   Medications    albuterol sulfate HFA (VENTOLIN HFA) 108 (90 Base) MCG/ACT inhaler     Sig: Inhale 2 puffs into the lungs 4 times daily as needed for Wheezing     Dispense:  18 g     Refill:  0    methylphenidate (CONCERTA) 54 MG extended release tablet     Sig: Take 1 tablet by mouth every morning. Dispense:  30 tablet     Refill:  0         1. Patient taking medication appropriately, no concern for diversion. No reported ADRs. Can continue current medications at this time. 2. Inhaler refilled for self-reported right of airway disease  3. Influenza vaccine provided today    FOLLOW UP AND INSTRUCTIONS:  Return in about 3 months (around 12/29/2021) for ADHD. · Edna Sanderson received counseling on the following healthy behaviors:nutrition and medication adherence    · Discussed use, benefit, and side effects of prescribed medications. Barriers to  medication compliance addressed. All patient questions answered. Pt  verbalized understanding of all instructions given. · Patient given educational materials - see patient instructions      · Patient advised to contact scheduling offices for any referrals or imaging orders  placed today if they have not been contacted in 48 hours. Return in about 3 months (around 12/29/2021) for ADHD. An electronic signature was used to authenticate this note. --Beatriz Fernández, BREE - CNP on 9/29/2021 at 4:42 PM  Visit Information    Have you changed or started any medications since your last visit including any over-the-counter medicines, vitamins, or herbal medicines? no   Are you having any side effects from any of your medications? -  no  Have you stopped taking any of your medications? Is so, why? -  no    Have you seen any other physician or provider since your last visit? No  Have you had any other diagnostic tests since your last visit? No  Have you been seen in the emergency room and/or had an admission to a hospital since we last saw you? No  Have you had your routine dental cleaning in the past 6 months? no    Have you activated your [employer] account? If not, what are your barriers?  Yes     Patient Care Team:  Beatriz Fernández APRN - CNP as PCP - General (Family Medicine)  BREE Somers - CNP as PCP - [facility] Empaneled Provider    Medical History Review  Past Medical, Family, and Social History reviewed and does not contribute to the patient presenting condition    Health Maintenance   Topic Date Due    Hepatitis C screen  Never done    Hepatitis B vaccine (3 of 3 - 3-dose primary series) 1997    HIV screen  Never done    DTaP/Tdap/Td vaccine (8 - Td or Tdap) 11/28/2030    Hepatitis A vaccine  Completed    Hib vaccine  Completed    HPV vaccine  Completed    Varicella vaccine  Completed    Flu vaccine  Completed    COVID-19 Vaccine  Completed    Meningococcal (ACWY) vaccine  Aged Out    Pneumococcal 0-64 years Vaccine  Aged Out

## 2021-11-08 ENCOUNTER — PATIENT MESSAGE (OUTPATIENT)
Dept: PRIMARY CARE CLINIC | Age: 26
End: 2021-11-08

## 2021-11-08 DIAGNOSIS — F90.0 ADHD (ATTENTION DEFICIT HYPERACTIVITY DISORDER), INATTENTIVE TYPE: ICD-10-CM

## 2021-11-09 RX ORDER — METHYLPHENIDATE HYDROCHLORIDE 54 MG/1
54 TABLET ORAL EVERY MORNING
Qty: 30 TABLET | Refills: 0 | Status: SHIPPED | OUTPATIENT
Start: 2021-11-09 | End: 2021-12-12 | Stop reason: SDUPTHER

## 2021-11-09 NOTE — TELEPHONE ENCOUNTER
From: Siri Koch  To: Joellen Torres  Sent: 11/8/2021 5:03 PM EST  Subject: Prescription Question    Hello. Would you order another rx for the Concerta 77QZ? Thank you.

## 2021-11-13 ENCOUNTER — PATIENT MESSAGE (OUTPATIENT)
Dept: PRIMARY CARE CLINIC | Age: 26
End: 2021-11-13

## 2021-11-15 NOTE — TELEPHONE ENCOUNTER
From: Ana Cristina Auguste  To: Krissy Iraheta  Sent: 11/13/2021 3:38 PM EST  Subject: Prescription    Would you please send in another refill for my Concerta 38WX? Thank you.

## 2021-12-12 DIAGNOSIS — F90.0 ADHD (ATTENTION DEFICIT HYPERACTIVITY DISORDER), INATTENTIVE TYPE: ICD-10-CM

## 2021-12-13 RX ORDER — METHYLPHENIDATE HYDROCHLORIDE 54 MG/1
54 TABLET ORAL EVERY MORNING
Qty: 30 TABLET | Refills: 0 | Status: SHIPPED | OUTPATIENT
Start: 2021-12-13 | End: 2022-01-31

## 2021-12-13 NOTE — TELEPHONE ENCOUNTER
Next Visit Date:  12/29/2021    No results found for: LABA1C          ( goal A1C is < 7)   No results found for: LABMICR  No results found for: LDLCHOLESTEROL, LDLCALC    (goal LDL is <100)   AST (U/L)   Date Value   03/03/2020 20     ALT (U/L)   Date Value   03/03/2020 26     BUN (mg/dL)   Date Value   03/03/2020 18     BP Readings from Last 3 Encounters:   09/29/21 128/89   06/23/21 124/74   05/28/21 123/76          (goal 120/80)        Patient Active Problem List:     ADHD (attention deficit hyperactivity disorder), inattentive type      ----January Grimes

## 2021-12-29 ENCOUNTER — OFFICE VISIT (OUTPATIENT)
Dept: PRIMARY CARE CLINIC | Age: 26
End: 2021-12-29
Payer: COMMERCIAL

## 2021-12-29 VITALS
SYSTOLIC BLOOD PRESSURE: 134 MMHG | BODY MASS INDEX: 26.6 KG/M2 | DIASTOLIC BLOOD PRESSURE: 80 MMHG | HEIGHT: 69 IN | WEIGHT: 179.6 LBS | HEART RATE: 85 BPM | OXYGEN SATURATION: 98 % | TEMPERATURE: 97 F

## 2021-12-29 DIAGNOSIS — F90.0 ADHD (ATTENTION DEFICIT HYPERACTIVITY DISORDER), INATTENTIVE TYPE: Primary | ICD-10-CM

## 2021-12-29 DIAGNOSIS — R09.89 CHRONIC SINUS COMPLAINTS: ICD-10-CM

## 2021-12-29 PROCEDURE — 99213 OFFICE O/P EST LOW 20 MIN: CPT | Performed by: NURSE PRACTITIONER

## 2021-12-29 ASSESSMENT — ENCOUNTER SYMPTOMS
RHINORRHEA: 0
CHEST TIGHTNESS: 0
SORE THROAT: 0
COUGH: 0
TROUBLE SWALLOWING: 0
EYE ITCHING: 0
SINUS PRESSURE: 1

## 2021-12-29 ASSESSMENT — PATIENT HEALTH QUESTIONNAIRE - PHQ9
SUM OF ALL RESPONSES TO PHQ9 QUESTIONS 1 & 2: 0
SUM OF ALL RESPONSES TO PHQ QUESTIONS 1-9: 0
2. FEELING DOWN, DEPRESSED OR HOPELESS: 0
SUM OF ALL RESPONSES TO PHQ QUESTIONS 1-9: 0
1. LITTLE INTEREST OR PLEASURE IN DOING THINGS: 0
SUM OF ALL RESPONSES TO PHQ QUESTIONS 1-9: 0

## 2021-12-29 NOTE — PROGRESS NOTES
[facility]  [address] [facility] [address]  Dept: [phone]  Dept Fax: [phone]    Patient Care Team:  BREE Leggett CNP as PCP - General (Family Medicine)  BREE Leggett CNP as PCP - [facility] Empaneled Provider    2021     Ana Cristina Auguste (:  1995)is a 27 y.o. male, here for evaluation of the following medical concerns:   Chief Complaint   Patient presents with    3 Month Follow-Up    Referral - General     ENT, discuss nasal passage       Ana Cristina Auguste is here for chronic nasal congestion and ADHD. Ana Cristina Auguste is a 82-year-old male here today for ADHD f/u. He endorses a past medical history of ADHD only. Taking Concerta daily and Focalin used as needed for focusing purposes. Will use the focalin if driving or need for a short shift. 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 He is still also working at [employer]. Diagnosed as a Freshman in high school, [address] [place] [address] sent him to their counselor and was sent to a [place].  He admits he always had trouble in school, difficulty with grades. Is night and day on meds, trouble focusing. Also admits to lack ambition when meds wear off at the end of the day. He denies any palpitations or dizziness. He states he will have nausea if he does not eat with his medication. No difficulty falling asleep unless taken late in the day. No weight loss or appetite change. Symptoms ongoing for the last year, often with one nare feeling blocked. Never the same side, worse on the right side  Hx of daily headaches that he feels are improved. + facial pressure  Denies rhinorrhea. Denies any facial injuries, no previous ENT surgeries    . Review of Systems   Constitutional: Negative for chills and fever.    HENT: Positive for congestion and sinus pressure. Negative for ear discharge, hearing loss, nosebleeds, postnasal drip, rhinorrhea, sneezing, sore throat, tinnitus and trouble swallowing. Eyes: Negative for itching. Respiratory: Negative for cough and chest tightness. Cardiovascular: Negative for palpitations. Allergic/Immunologic: Negative for environmental allergies. Neurological: Negative for light-headedness and headaches. Psychiatric/Behavioral: Negative for sleep disturbance. The patient is not nervous/anxious. Prior to Visit Medications    Medication Sig Taking? Authorizing Provider   methylphenidate (CONCERTA) 54 MG extended release tablet Take 1 tablet by mouth every morning. Yes BREE Cueva CNP   Multiple Vitamin (MULTI-VITAMIN DAILY) TABS Take by mouth Yes Historical Provider, MD   albuterol sulfate HFA (VENTOLIN HFA) 108 (90 Base) MCG/ACT inhaler Inhale 2 puffs into the lungs 4 times daily as needed for Wheezing Yes BREE Cueva CNP   mometasone (ELOCON) 0.1 % cream Apply topically daily. Yes Nika Santamaria MD   dexmethylphenidate (FOCALIN) 10 MG tablet Take 1 tablet by mouth daily as needed (ADHD) for up to 30 days. BREE Cueva CNP        Social History     Tobacco Use    Smoking status: Never Smoker    Smokeless tobacco: Never Used   Substance Use Topics    Alcohol use: Not on file        Vitals:    12/29/21 1523   BP: 134/80   Site: Left Upper Arm   Position: Sitting   Pulse: 85   Temp: 97 °F (36.1 °C)   TempSrc: Temporal   SpO2: 98%   Weight: 179 lb 9.6 oz (81.5 kg)   Height: 5' 9\" (1.753 m)     Estimated body mass index is 26.52 kg/m² as calculated from the following:    Height as of this encounter: 5' 9\" (1.753 m). Weight as of this encounter: 179 lb 9.6 oz (81.5 kg).     DIAGNOSTIC FINDINGS:  CBC:  Lab Results   Component Value Date    WBC 15.6 03/03/2020    HGB 17.9 03/03/2020     03/03/2020     01/11/2012       BMP:    Lab Results   Component Value Date  03/03/2020    K 3.8 03/03/2020    CL 98 03/03/2020    CO2 20 03/03/2020    BUN 18 03/03/2020    CREATININE 0.93 03/03/2020    GLUCOSE 126 03/03/2020    GLUCOSE 101 01/11/2012       HEMOGLOBIN A1C: No results found for: LABA1C    FASTING LIPID PANEL:No results found for: CHOL, HDL, TRIG    Physical Exam  Vitals and nursing note reviewed. Constitutional:       General: He is not in acute distress. Appearance: Normal appearance. He is well-developed. He is not ill-appearing. HENT:      Head: Normocephalic. Nose: No nasal deformity, septal deviation or rhinorrhea. Right Nostril: No foreign body or occlusion. Left Nostril: No foreign body or occlusion. Eyes:      General: No scleral icterus. Pupils: Pupils are equal, round, and reactive to light. Cardiovascular:      Rate and Rhythm: Normal rate and regular rhythm. Heart sounds: No murmur heard. Pulmonary:      Effort: Pulmonary effort is normal.      Breath sounds: Normal breath sounds. Abdominal:      General: Abdomen is flat. Palpations: Abdomen is soft. Musculoskeletal:      Cervical back: Normal range of motion and neck supple. Skin:     General: Skin is warm and dry. Neurological:      General: No focal deficit present. Mental Status: He is alert and oriented to person, place, and time. Coordination: Coordination normal.   Psychiatric:         Behavior: Behavior normal. Behavior is cooperative. Thought Content: Thought content normal.         Judgment: Judgment normal.         ASSESSMENT     Diagnosis Orders   1. ADHD (attention deficit hyperactivity disorder), inattentive type     2. Chronic sinus complaints  CT SINUS Bhavana Matter, MD, Otolaryngology, [facility]:  No orders of the defined types were placed in this encounter. 1. ADHD symptoms remain stable, no treatment changes today.   2. 1 year c/o congestion without known trauma or allergy symptoms, CT scan and ENT referral placed. FOLLOW UP AND INSTRUCTIONS:  Return in about 3 months (around 3/29/2022). · Candelario Favre received counseling on the following healthy behaviors:nutrition and medication adherence    · Discussed use, benefit, and side effects of prescribed medications. Barriers to  medication compliance addressed. All patient questions answered. Pt  verbalized understanding of all instructions given. · Patient given educational materials - see patient instructions      · Patient advised to contact scheduling offices for any referrals or imaging orders  placed today if they have not been contacted in 48 hours. Return in about 3 months (around 3/29/2022). An electronic signature was used to authenticate this note.     --BREE Odonnell - CNP on 12/29/2021 at 3:57 PM

## 2022-01-29 ENCOUNTER — PATIENT MESSAGE (OUTPATIENT)
Dept: PRIMARY CARE CLINIC | Age: 27
End: 2022-01-29

## 2022-01-29 DIAGNOSIS — F90.0 ADHD (ATTENTION DEFICIT HYPERACTIVITY DISORDER), INATTENTIVE TYPE: ICD-10-CM

## 2022-01-30 NOTE — TELEPHONE ENCOUNTER
Health Maintenance   Topic Date Due    Hepatitis C screen  Never done    Hepatitis B vaccine (3 of 3 - 3-dose primary series) 1997    HIV screen  Never done    Depression Screen  12/29/2022    DTaP/Tdap/Td vaccine (8 - Td or Tdap) 11/28/2030    Hepatitis A vaccine  Completed    Hib vaccine  Completed    HPV vaccine  Completed    Varicella vaccine  Completed    Flu vaccine  Completed    COVID-19 Vaccine  Completed    Meningococcal (ACWY) vaccine  Aged Out    Pneumococcal 0-64 years Vaccine  Aged Out             (applicable per patient's age: Cancer Screenings, Depression Screening, Fall Risk Screening, Immunizations)    AST (U/L)   Date Value   03/03/2020 20     ALT (U/L)   Date Value   03/03/2020 26     BUN (mg/dL)   Date Value   03/03/2020 18      (goal A1C is < 7)   (goal LDL is <100) need 30-50% reduction from baseline     BP Readings from Last 3 Encounters:   12/29/21 134/80   09/29/21 128/89   06/23/21 124/74    (goal /80)      All Future Testing planned in [facility]:  Lab Frequency Next Occurrence   CT SINUS WO CONTRAST Once 02/13/2022       Next Visit Date:  Future Appointments   Date Time Provider Alejandro Keith   3/29/2022  3:00 PM BREE Shaffer -  [facility] [place]            Patient Active Problem List:     ADHD (attention deficit hyperactivity disorder), inattentive type

## 2022-01-30 NOTE — TELEPHONE ENCOUNTER
From: Edwar Edwards  To: Marleni Arevalo  Sent: 1/29/2022 11:52 AM EST  Subject: Concerta    May I please have a refill for my Concerta sent to [address]? Thank you.

## 2022-01-31 RX ORDER — METHYLPHENIDATE HYDROCHLORIDE 54 MG/1
TABLET ORAL
Qty: 30 TABLET | Refills: 0 | Status: SHIPPED | OUTPATIENT
Start: 2022-01-31 | End: 2022-03-17 | Stop reason: SDUPTHER

## 2022-03-17 ENCOUNTER — PATIENT MESSAGE (OUTPATIENT)
Dept: PRIMARY CARE CLINIC | Age: 27
End: 2022-03-17

## 2022-03-17 DIAGNOSIS — F90.0 ADHD (ATTENTION DEFICIT HYPERACTIVITY DISORDER), INATTENTIVE TYPE: ICD-10-CM

## 2022-03-17 RX ORDER — METHYLPHENIDATE HYDROCHLORIDE 54 MG/1
TABLET ORAL
Qty: 30 TABLET | Refills: 0 | Status: SHIPPED | OUTPATIENT
Start: 2022-03-17 | End: 2022-04-26

## 2022-03-17 NOTE — TELEPHONE ENCOUNTER
From: Suzie Reyes  To: Sabine Hoff  Sent: 3/17/2022 2:52 PM EDT  Subject: Concerta    I have an appt with you on March 29 and was wondering if you could send in a refill for my Concerta now. Thank you.

## 2022-04-26 ENCOUNTER — OFFICE VISIT (OUTPATIENT)
Dept: PRIMARY CARE CLINIC | Age: 27
End: 2022-04-26
Payer: COMMERCIAL

## 2022-04-26 VITALS
DIASTOLIC BLOOD PRESSURE: 83 MMHG | HEART RATE: 86 BPM | TEMPERATURE: 97.2 F | OXYGEN SATURATION: 99 % | WEIGHT: 184 LBS | BODY MASS INDEX: 27.17 KG/M2 | SYSTOLIC BLOOD PRESSURE: 131 MMHG

## 2022-04-26 DIAGNOSIS — F90.0 ADHD (ATTENTION DEFICIT HYPERACTIVITY DISORDER), INATTENTIVE TYPE: Primary | ICD-10-CM

## 2022-04-26 PROCEDURE — 99213 OFFICE O/P EST LOW 20 MIN: CPT | Performed by: NURSE PRACTITIONER

## 2022-04-26 RX ORDER — METHYLPHENIDATE HYDROCHLORIDE 72 MG/1
TABLET, EXTENDED RELEASE ORAL
Qty: 30 TABLET | Refills: 0 | Status: SHIPPED | OUTPATIENT
Start: 2022-04-26 | End: 2022-06-20 | Stop reason: SDUPTHER

## 2022-04-26 SDOH — ECONOMIC STABILITY: FOOD INSECURITY: WITHIN THE PAST 12 MONTHS, THE FOOD YOU BOUGHT JUST DIDN'T LAST AND YOU DIDN'T HAVE MONEY TO GET MORE.: NEVER TRUE

## 2022-04-26 SDOH — ECONOMIC STABILITY: FOOD INSECURITY: WITHIN THE PAST 12 MONTHS, YOU WORRIED THAT YOUR FOOD WOULD RUN OUT BEFORE YOU GOT MONEY TO BUY MORE.: NEVER TRUE

## 2022-04-26 ASSESSMENT — ENCOUNTER SYMPTOMS
WHEEZING: 0
DIARRHEA: 0
SHORTNESS OF BREATH: 0
VOMITING: 0
ABDOMINAL PAIN: 0
TROUBLE SWALLOWING: 0
BLOOD IN STOOL: 0

## 2022-04-26 ASSESSMENT — SOCIAL DETERMINANTS OF HEALTH (SDOH): HOW HARD IS IT FOR YOU TO PAY FOR THE VERY BASICS LIKE FOOD, HOUSING, MEDICAL CARE, AND HEATING?: NOT HARD AT ALL

## 2022-04-26 NOTE — PROGRESS NOTES
[facility]  [address] [facility] [address]  Dept: [phone]  Dept Fax: [phone]    Patient Care Team:  BREE Davis CNP as PCP - General (Family Medicine)  BREE Davis CNP as PCP - [facility] Provider    2022     Keara Shi (:  1995)is a 24 y.o. male, here for evaluation of the following medical concerns:   Chief Complaint   Patient presents with    ADHD     Follow-up    Discuss Medications       Keara Shi is here for chronic nasal congestion and ADHD. Keara Shi is a 73-year-old male here today for ADHD f/u. He endorses a past medical history of ADHD only. Taking Concerta daily and Focalin used as needed for focusing purposes. Will use the focalin if driving or need for a short shift. Takes Concerta if working a longer shift. Lately feels as if Concerta is wearing off early. Days with 10 hour shifts, at the end of 7-8 hours his focus becomes hazy. 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 He is still also working at [employer]. Plans to obtain his masters degree once he gains employment in his area of specialty    Diagnosed as a Freshman in high school, [address] [place] [address] sent him to their counselor and was sent to a [facility].  He admits he always had trouble in school, difficulty with grades. Is night and day on meds, trouble focusing. Also admits to lack ambition when meds wear off at the end of the day. He denies any palpitations or dizziness. He states he will have nausea if he does not eat with his medication. No difficulty falling asleep unless taken late in the day. No weight loss or appetite change. .    Review of Systems   Constitutional: Negative for appetite change, fever and unexpected weight change. HENT: Negative for hearing loss and trouble swallowing.     Eyes: Negative for visual disturbance. Respiratory: Negative for shortness of breath and wheezing. Cardiovascular: Negative for chest pain and palpitations. Gastrointestinal: Negative for abdominal pain, blood in stool, diarrhea and vomiting. Endocrine: Negative for polydipsia and polyuria. Genitourinary: Negative for difficulty urinating, frequency, hematuria and testicular pain. Musculoskeletal: Negative for myalgias and neck pain. Skin: Negative for rash. Neurological: Negative for dizziness, seizures, syncope, numbness and headaches. Psychiatric/Behavioral: Positive for decreased concentration. Negative for agitation, dysphoric mood, sleep disturbance and suicidal ideas. The patient is not nervous/anxious. Prior to Visit Medications    Medication Sig Taking? Authorizing Provider   methylphenidate 72 MG TBCR TAKE 1 TABLET BY MOUTH ONCE DAILY IN THE MORNING. Yes Kym Cabot, APRN - CNP   Multiple Vitamin (MULTI-VITAMIN DAILY) TABS Take by mouth Yes Historical Provider, MD   albuterol sulfate HFA (VENTOLIN HFA) 108 (90 Base) MCG/ACT inhaler Inhale 2 puffs into the lungs 4 times daily as needed for Wheezing Yes Kym Cabot, APRN - CNP   dexmethylphenidate (FOCALIN) 10 MG tablet Take 1 tablet by mouth daily as needed (ADHD) for up to 30 days. Yes Kym Cabot, APRN - CNP   mometasone (ELOCON) 0.1 % cream Apply topically daily. Yes Lu Way MD        Social History     Tobacco Use    Smoking status: Never Smoker    Smokeless tobacco: Never Used   Substance Use Topics    Alcohol use: Not on file        Vitals:    04/26/22 1242   BP: 131/83   Site: Left Upper Arm   Position: Sitting   Pulse: 86   Temp: 97.2 °F (36.2 °C)   TempSrc: Temporal   SpO2: 99%   Weight: 184 lb (83.5 kg)     Estimated body mass index is 27.17 kg/m² as calculated from the following:    Height as of 12/29/21: 5' 9\" (1.753 m). Weight as of this encounter: 184 lb (83.5 kg).     DIAGNOSTIC FINDINGS:  CBC:  Lab Results Component Value Date    WBC 15.6 03/03/2020    HGB 17.9 03/03/2020     03/03/2020     01/11/2012       BMP:    Lab Results   Component Value Date     03/03/2020    K 3.8 03/03/2020    CL 98 03/03/2020    CO2 20 03/03/2020    BUN 18 03/03/2020    CREATININE 0.93 03/03/2020    GLUCOSE 126 03/03/2020    GLUCOSE 101 01/11/2012       HEMOGLOBIN A1C: No results found for: LABA1C    FASTING LIPID PANEL:No results found for: CHOL, HDL, TRIG    Physical Exam  Vitals and nursing note reviewed. Constitutional:       General: He is not in acute distress. Appearance: Normal appearance. He is well-developed. He is not ill-appearing. HENT:      Head: Normocephalic. Nose: No nasal deformity, septal deviation or rhinorrhea. Right Nostril: No foreign body or occlusion. Left Nostril: No foreign body or occlusion. Eyes:      General: No scleral icterus. Pupils: Pupils are equal, round, and reactive to light. Cardiovascular:      Rate and Rhythm: Normal rate and regular rhythm. Heart sounds: No murmur heard. Pulmonary:      Effort: Pulmonary effort is normal.      Breath sounds: Normal breath sounds. Abdominal:      General: Abdomen is flat. Palpations: Abdomen is soft. Musculoskeletal:      Cervical back: Normal range of motion and neck supple. Skin:     General: Skin is warm and dry. Neurological:      General: No focal deficit present. Mental Status: He is alert and oriented to person, place, and time. Coordination: Coordination normal.   Psychiatric:         Behavior: Behavior normal. Behavior is cooperative. Thought Content: Thought content normal.         Judgment: Judgment normal.         ASSESSMENT     Diagnosis Orders   1.  ADHD (attention deficit hyperactivity disorder), inattentive type  methylphenidate 72 MG TBCR          PLAN:  Orders Placed This Encounter   Medications    methylphenidate 72 MG TBCR     Sig: TAKE 1 TABLET BY MOUTH ONCE DAILY IN THE MORNING. Dispense:  30 tablet     Refill:  0         1. We will increase Concerta ER to 72 mg a day. Discussed possible ADRs. Potential insurance restrictions, unable to obtain and we discussed using as needed immediate release methylphenidate in the mid afternoon on days he has longer shifts. Patient verbalized under standing and agrees with plan    FOLLOW UP AND INSTRUCTIONS:  Return in about 3 months (around 7/26/2022). · Marcie Ards received counseling on the following healthy behaviors:exercise and medication adherence    · Discussed use, benefit, and side effects of prescribed medications. Barriers to  medication compliance addressed. All patient questions answered. Pt  verbalized understanding of all instructions given. · Patient given educational materials - see patient instructions      · Patient advised to contact scheduling offices for any referrals or imaging orders  placed today if they have not been contacted in 48 hours. Return in about 3 months (around 7/26/2022). An electronic signature was used to authenticate this note. --BREE Neff CNP on 4/26/2022 at 1:28 PM  Visit Information    Have you changed or started any medications since your last visit including any over-the-counter medicines, vitamins, or herbal medicines? no   Are you having any side effects from any of your medications? -  no  Have you stopped taking any of your medications? Is so, why? -  no    Have you seen any other physician or provider since your last visit? No  Have you had any other diagnostic tests since your last visit? No  Have you been seen in the emergency room and/or had an admission to a hospital since we last saw you? No  Have you had your routine dental cleaning in the past 6 months? no    Have you activated your [employer] account? If not, what are your barriers?  Yes     Patient Care Team:  BREE Neff CNP as PCP - General (Family Medicine)  Dyan Speaker Nae Wright - CNP as PCP - [facility] Empaneled Provider    Medical History Review  Past Medical, Family, and Social History reviewed and does not contribute to the patient presenting condition    Health Maintenance   Topic Date Due    Hepatitis B vaccine (3 of 3 - 3-dose primary series) 1997    HIV screen  Never done    Hepatitis C screen  Never done    Depression Screen  12/29/2022    DTaP/Tdap/Td vaccine (8 - Td or Tdap) 11/28/2030    Hepatitis A vaccine  Completed    Hib vaccine  Completed    HPV vaccine  Completed    Varicella vaccine  Completed    Flu vaccine  Completed    COVID-19 Vaccine  Completed    Meningococcal (ACWY) vaccine  Aged Out    Pneumococcal 0-64 years Vaccine  Aged Out

## 2022-05-27 ENCOUNTER — PATIENT MESSAGE (OUTPATIENT)
Dept: PRIMARY CARE CLINIC | Age: 27
End: 2022-05-27

## 2022-05-27 DIAGNOSIS — F90.0 ADHD (ATTENTION DEFICIT HYPERACTIVITY DISORDER), INATTENTIVE TYPE: ICD-10-CM

## 2022-05-29 NOTE — TELEPHONE ENCOUNTER
From: Renetta Fails  To: Carlito Singer  Sent: 5/27/2022 10:43 PM EDT  Subject: Focalin    Hello. I am almost out of my Focalin and was hoping to get a refill sent to the pharmacy at [facility]. Thank you.

## 2022-05-31 RX ORDER — DEXMETHYLPHENIDATE HYDROCHLORIDE 10 MG/1
10 TABLET ORAL DAILY PRN
Qty: 20 TABLET | Refills: 0 | Status: SHIPPED | OUTPATIENT
Start: 2022-05-31 | End: 2022-07-26 | Stop reason: SDUPTHER

## 2022-06-20 ENCOUNTER — PATIENT MESSAGE (OUTPATIENT)
Dept: PRIMARY CARE CLINIC | Age: 27
End: 2022-06-20

## 2022-06-20 DIAGNOSIS — F90.0 ADHD (ATTENTION DEFICIT HYPERACTIVITY DISORDER), INATTENTIVE TYPE: ICD-10-CM

## 2022-06-20 RX ORDER — METHYLPHENIDATE HYDROCHLORIDE 72 MG/1
TABLET, EXTENDED RELEASE ORAL
Qty: 30 TABLET | Refills: 0 | Status: SHIPPED | OUTPATIENT
Start: 2022-06-20 | End: 2022-06-21 | Stop reason: DRUGHIGH

## 2022-06-20 NOTE — TELEPHONE ENCOUNTER
From: Magdy Moran  To: Dane Milner  Sent: 6/20/2022 6:03 AM EDT  Subject: Concerta     Hello. May I get a refill on my Concerta please? Thank you.

## 2022-06-21 DIAGNOSIS — F90.0 ADHD (ATTENTION DEFICIT HYPERACTIVITY DISORDER), INATTENTIVE TYPE: Primary | ICD-10-CM

## 2022-06-21 RX ORDER — METHYLPHENIDATE HYDROCHLORIDE 36 MG/1
72 TABLET ORAL DAILY
Qty: 60 TABLET | Refills: 0 | Status: SHIPPED | OUTPATIENT
Start: 2022-07-21 | End: 2022-07-26

## 2022-06-21 RX ORDER — METHYLPHENIDATE HYDROCHLORIDE 36 MG/1
72 TABLET ORAL DAILY
Qty: 60 TABLET | Refills: 0 | Status: SHIPPED | OUTPATIENT
Start: 2022-08-20 | End: 2022-07-26

## 2022-06-21 RX ORDER — METHYLPHENIDATE HYDROCHLORIDE 36 MG/1
72 TABLET ORAL DAILY
Qty: 60 TABLET | Refills: 0 | Status: SHIPPED | OUTPATIENT
Start: 2022-06-21 | End: 2022-07-26 | Stop reason: SDUPTHER

## 2022-07-26 ENCOUNTER — OFFICE VISIT (OUTPATIENT)
Dept: PRIMARY CARE CLINIC | Age: 27
End: 2022-07-26
Payer: COMMERCIAL

## 2022-07-26 VITALS
DIASTOLIC BLOOD PRESSURE: 82 MMHG | BODY MASS INDEX: 27.17 KG/M2 | WEIGHT: 184 LBS | OXYGEN SATURATION: 94 % | SYSTOLIC BLOOD PRESSURE: 127 MMHG | TEMPERATURE: 98.4 F | HEART RATE: 96 BPM

## 2022-07-26 DIAGNOSIS — F90.0 ATTENTION DEFICIT HYPERACTIVITY DISORDER (ADHD), PREDOMINANTLY INATTENTIVE TYPE: Primary | ICD-10-CM

## 2022-07-26 DIAGNOSIS — F90.0 ADHD (ATTENTION DEFICIT HYPERACTIVITY DISORDER), INATTENTIVE TYPE: ICD-10-CM

## 2022-07-26 PROCEDURE — 99213 OFFICE O/P EST LOW 20 MIN: CPT | Performed by: NURSE PRACTITIONER

## 2022-07-26 RX ORDER — METHYLPHENIDATE HYDROCHLORIDE 36 MG/1
72 TABLET ORAL DAILY
Qty: 60 TABLET | Refills: 0 | Status: SHIPPED | OUTPATIENT
Start: 2022-07-26 | End: 2022-10-27

## 2022-07-26 RX ORDER — DEXMETHYLPHENIDATE HYDROCHLORIDE 10 MG/1
10 TABLET ORAL DAILY PRN
Qty: 20 TABLET | Refills: 0 | Status: SHIPPED | OUTPATIENT
Start: 2022-07-26 | End: 2022-10-27

## 2022-07-26 ASSESSMENT — PATIENT HEALTH QUESTIONNAIRE - PHQ9
SUM OF ALL RESPONSES TO PHQ QUESTIONS 1-9: 0
1. LITTLE INTEREST OR PLEASURE IN DOING THINGS: 0
2. FEELING DOWN, DEPRESSED OR HOPELESS: 0
SUM OF ALL RESPONSES TO PHQ QUESTIONS 1-9: 0
SUM OF ALL RESPONSES TO PHQ9 QUESTIONS 1 & 2: 0

## 2022-07-26 NOTE — PROGRESS NOTES
[facility]  [address] [facility] [address]  Dept: [phone]  Dept Fax: [phone]    Patient Care Team:  BREE Odonnell CNP as PCP - General (Family Medicine)  BREE Odonnell CNP as PCP - [facility]    7/26/2022   No follow-ups on file. An electronic signature was used to authenticate this note. --Armond Goldmann, MA on 7/26/2022 at 1:29 PM  Visit Information    Have you changed or started any medications since your last visit including any over-the-counter medicines, vitamins, or herbal medicines? no   Are you having any side effects from any of your medications? -  no  Have you stopped taking any of your medications? Is so, why? -  no    Have you seen any other physician or provider since your last visit? No  Have you had any other diagnostic tests since your last visit? No  Have you been seen in the emergency room and/or had an admission to a hospital since we last saw you? No  Have you had your routine dental cleaning in the past 6 months? no    Have you activated your [employer] account? If not, what are your barriers?  Yes     Patient Care Team:  BREE Odonnell CNP as PCP - General (Family Medicine)  BREE Odonnell CNP as PCP - [facility]    Medical History Review  Past Medical, Family, and Social History reviewed and does not contribute to the patient presenting condition    Health Maintenance   Topic Date Due    Hepatitis B vaccine (3 of 3 - 3-dose primary series) 1997    HIV screen  Never done    Hepatitis C screen  Never done    Flu vaccine (1) 09/01/2022    Depression Screen  12/29/2022    DTaP/Tdap/Td vaccine (8 - Td or Tdap) 11/28/2030    Hepatitis A vaccine  Completed    Hib vaccine  Completed    HPV vaccine  Completed    Varicella vaccine  Completed    COVID-19 Vaccine  Completed    Meningococcal (ACWY) vaccine  Aged Out Pneumococcal 0-64 years Vaccine  Aged Out

## 2022-07-26 NOTE — PROGRESS NOTES
Alvin Sanz (: 1995) is a 24 y.o. male is here for evaluation of the following chief complaint(s): ADHD (3M F/U)    Assessment/Plan:   1. ADHD (attention deficit hyperactivity disorder), inattentive type  The following orders have not been finalized:  -     methylphenidate (CONCERTA) 36 MG extended release tablet    No follow-ups on file. Subjective/Objective:   Since last visit: no change. Medication compliance: work days, 5 days a week. Side effects from medication include: none.  has been reviewed. Pertinent items are noted in HPI. Exam:  General: alert, appears stated age, and cooperative  Heart exam: normal rate, regular rhythm, normal S1, S2, no murmurs, rubs, clicks or gallops  Lung exam: clear to auscultation bilaterally   /82 (Site: Left Upper Arm, Position: Sitting)   Pulse 96   Temp 98.4 °F (36.9 °C) (Infrared)   Wt 184 lb (83.5 kg)   SpO2 94%   BMI 27.17 kg/m²     On this date 2022 I have spent 15 minutes reviewing previous notes, test results and face to face with the patient discussing the diagnosis and importance of compliance with the treatment plan as well as documenting on the day of the visit. An electronic signature was used to authenticate this note.   -- BREE Judge - CNP

## 2022-08-29 DIAGNOSIS — L55.9 SUNBURN: ICD-10-CM

## 2022-08-30 RX ORDER — MOMETASONE FUROATE 1 MG/G
CREAM TOPICAL
Qty: 45 G | Refills: 0 | Status: SHIPPED | OUTPATIENT
Start: 2022-08-30

## 2022-08-30 NOTE — TELEPHONE ENCOUNTER
Health Maintenance   Topic Date Due    Hepatitis B vaccine (3 of 3 - 3-dose primary series) 1997    HIV screen  Never done    Hepatitis C screen  Never done    Flu vaccine (1) 09/01/2022    Depression Screen  07/26/2023    DTaP/Tdap/Td vaccine (8 - Td or Tdap) 11/28/2030    Hepatitis A vaccine  Completed    Hib vaccine  Completed    HPV vaccine  Completed    Varicella vaccine  Completed    COVID-19 Vaccine  Completed    Meningococcal (ACWY) vaccine  Aged Out    Pneumococcal 0-64 years Vaccine  Aged Out             (applicable per patient's age: Cancer Screenings, Depression Screening, Fall Risk Screening, Immunizations)    AST (U/L)   Date Value   03/03/2020 20     ALT (U/L)   Date Value   03/03/2020 26     BUN (mg/dL)   Date Value   03/03/2020 18      (goal A1C is < 7)   (goal LDL is <100) need 30-50% reduction from baseline     BP Readings from Last 3 Encounters:   07/26/22 127/82   04/26/22 131/83   12/29/21 134/80    (goal /80)      All Future Testing planned in [facility]:  Lab Frequency Next Occurrence   CT SINUS WO CONTRAST Once 02/13/2022       Next Visit Date:  Future Appointments   Date Time Provider Alejandro Keith   10/27/2022  7:15 AM Donny Adkins, APRN - [address]             Patient Active Problem List:     ADHD (attention deficit hyperactivity disorder), inattentive type

## 2022-10-27 ENCOUNTER — OFFICE VISIT (OUTPATIENT)
Dept: PRIMARY CARE CLINIC | Age: 27
End: 2022-10-27
Payer: COMMERCIAL

## 2022-10-27 VITALS
HEART RATE: 94 BPM | DIASTOLIC BLOOD PRESSURE: 79 MMHG | TEMPERATURE: 97 F | OXYGEN SATURATION: 98 % | BODY MASS INDEX: 25.84 KG/M2 | SYSTOLIC BLOOD PRESSURE: 128 MMHG | WEIGHT: 175 LBS

## 2022-10-27 DIAGNOSIS — F90.0 ADHD, PREDOMINANTLY INATTENTIVE TYPE: Primary | ICD-10-CM

## 2022-10-27 DIAGNOSIS — Z23 FLU VACCINE NEED: ICD-10-CM

## 2022-10-27 PROCEDURE — 90674 CCIIV4 VAC NO PRSV 0.5 ML IM: CPT | Performed by: NURSE PRACTITIONER

## 2022-10-27 PROCEDURE — 90471 IMMUNIZATION ADMIN: CPT | Performed by: NURSE PRACTITIONER

## 2022-10-27 PROCEDURE — 99213 OFFICE O/P EST LOW 20 MIN: CPT | Performed by: NURSE PRACTITIONER

## 2022-10-27 RX ORDER — METHYLPHENIDATE HYDROCHLORIDE 36 MG/1
72 TABLET ORAL DAILY
Qty: 60 TABLET | Refills: 0 | Status: SHIPPED | OUTPATIENT
Start: 2022-10-27 | End: 2022-11-26

## 2022-10-27 RX ORDER — METHYLPHENIDATE HYDROCHLORIDE 36 MG/1
72 TABLET ORAL DAILY
Qty: 60 TABLET | Refills: 0 | Status: SHIPPED | OUTPATIENT
Start: 2022-11-26 | End: 2022-12-26

## 2022-10-27 RX ORDER — METHYLPHENIDATE HYDROCHLORIDE 36 MG/1
72 TABLET ORAL DAILY
Qty: 60 TABLET | Refills: 0 | Status: SHIPPED | OUTPATIENT
Start: 2022-12-26 | End: 2023-01-25

## 2022-10-27 NOTE — PROGRESS NOTES
Margie Peterson (: 1995) is a 24 y.o. male is here for evaluation of the following chief complaint(s): Follow-up (ADHD)    Assessment/Plan:   1. ADHD, predominantly inattentive type  -     methylphenidate (CONCERTA) 36 MG extended release tablet; Take 2 tablets by mouth daily for 30 days. , Disp-60 tablet, R-0Normal  -     methylphenidate (CONCERTA) 36 MG extended release tablet; Take 2 tablets by mouth daily for 30 days. , Disp-60 tablet, R-0Normal  -     methylphenidate (CONCERTA) 36 MG extended release tablet; Take 2 tablets by mouth daily for 30 days. , Disp-60 tablet, R-0Normal  2. Flu vaccine need    Return in 3 months (on 2023). Subjective/Objective:   Since last visit: no change. Medication compliance: days off work, about 2 days a week. Side effects from medication include: none.  has been reviewed. Neurological: positive for headaches      Exam:  General: alert, appears stated age, and cooperative  Heart exam: normal rate, regular rhythm, normal S1, S2, no murmurs, rubs, clicks or gallops  Lung exam: clear to auscultation bilaterally  Neuro: no gross deficits  Mental Status exam: normal mood, behavior, and thought processes, able to follow commands, good eye contact   /79   Pulse 94   Temp 97 °F (36.1 °C) (Temporal)   Wt 175 lb (79.4 kg)   SpO2 98%   BMI 25.84 kg/m²       An electronic signature was used to authenticate this note.   -- BREE Chaidez - CNP

## 2022-10-27 NOTE — PROGRESS NOTES
Visit Information    Have you changed or started any medications since your last visit including any over-the-counter medicines, vitamins, or herbal medicines? no   Are you having any side effects from any of your medications? -  no  Have you stopped taking any of your medications? Is so, why? -  no    Have you seen any other physician or provider since your last visit? No  Have you had any other diagnostic tests since your last visit? No  Have you been seen in the emergency room and/or had an admission to a hospital since we last saw you? No  Have you had your routine dental cleaning in the past 6 months? no    Have you activated your [employer] account? If not, what are your barriers?  Yes     Patient Care Team:  BREE Bañuelos CNP as PCP - General (Family Medicine)  BREE Bañuelos CNP as PCP - [facility] Provider    Medical History Review  Past Medical, Family, and Social History reviewed and does not contribute to the patient presenting condition    Health Maintenance   Topic Date Due    HIV screen  Never done    Hepatitis C screen  Never done    COVID-19 Vaccine (4 - Booster for Javier Peter series) 01/12/2022    Flu vaccine (1) 08/01/2022    Depression Screen  07/26/2023    DTaP/Tdap/Td vaccine (8 - Td or Tdap) 11/28/2030    Hepatitis A vaccine  Completed    Hib vaccine  Completed    HPV vaccine  Completed    Varicella vaccine  Completed    Meningococcal (ACWY) vaccine  Aged Out    Pneumococcal 0-64 years Vaccine  Aged Out

## 2023-01-27 ENCOUNTER — OFFICE VISIT (OUTPATIENT)
Dept: PRIMARY CARE CLINIC | Age: 28
End: 2023-01-27
Payer: COMMERCIAL

## 2023-01-27 VITALS
SYSTOLIC BLOOD PRESSURE: 119 MMHG | DIASTOLIC BLOOD PRESSURE: 77 MMHG | OXYGEN SATURATION: 97 % | HEART RATE: 101 BPM | HEIGHT: 70 IN | BODY MASS INDEX: 24.48 KG/M2 | WEIGHT: 171 LBS

## 2023-01-27 DIAGNOSIS — F90.0 ADHD, PREDOMINANTLY INATTENTIVE TYPE: Primary | ICD-10-CM

## 2023-01-27 PROCEDURE — 99213 OFFICE O/P EST LOW 20 MIN: CPT | Performed by: NURSE PRACTITIONER

## 2023-01-27 RX ORDER — METHYLPHENIDATE HYDROCHLORIDE 36 MG/1
72 TABLET ORAL DAILY
Qty: 60 TABLET | Refills: 0 | Status: SHIPPED | OUTPATIENT
Start: 2023-02-26 | End: 2023-03-28

## 2023-01-27 RX ORDER — METHYLPHENIDATE HYDROCHLORIDE 36 MG/1
72 TABLET ORAL DAILY
Qty: 60 TABLET | Refills: 0 | Status: SHIPPED | OUTPATIENT
Start: 2023-01-27 | End: 2023-02-26

## 2023-01-27 RX ORDER — METHYLPHENIDATE HYDROCHLORIDE 36 MG/1
72 TABLET ORAL DAILY
Qty: 60 TABLET | Refills: 0 | Status: SHIPPED | OUTPATIENT
Start: 2023-03-28 | End: 2023-04-27

## 2023-01-27 ASSESSMENT — PATIENT HEALTH QUESTIONNAIRE - PHQ9
SUM OF ALL RESPONSES TO PHQ QUESTIONS 1-9: 0
2. FEELING DOWN, DEPRESSED OR HOPELESS: 0
1. LITTLE INTEREST OR PLEASURE IN DOING THINGS: 0
SUM OF ALL RESPONSES TO PHQ9 QUESTIONS 1 & 2: 0
SUM OF ALL RESPONSES TO PHQ QUESTIONS 1-9: 0

## 2023-01-27 NOTE — PROGRESS NOTES
[facility]  [address] [address]  Dept: Yen Polanco (: 1995) is a 24 y.o. male is here for evaluation of the following chief complaint(s): ADD (3 month check up )    Assessment/Plan:   1. ADHD, predominantly inattentive type  -     methylphenidate (CONCERTA) 36 MG extended release tablet; Take 2 tablets by mouth daily for 30 days. Max Daily Amount: 72 mg, Disp-60 tablet, R-0Normal  -     methylphenidate (CONCERTA) 36 MG extended release tablet; Take 2 tablets by mouth daily for 30 days. Max Daily Amount: 72 mg, Disp-60 tablet, R-0Normal  -     methylphenidate (CONCERTA) 36 MG extended release tablet; Take 2 tablets by mouth daily for 30 days. Max Daily Amount: 72 mg, Disp-60 tablet, R-0Normal    Return in 3 months (on 2023). Subjective/Objective:   Since last visit: no change. Medication compliance: days off work, about 2 days a week. Side effects from medication include: none.  has been reviewed. Neurological: positive for headaches      Exam:  General: alert, appears stated age, and cooperative  Heart exam: normal rate, regular rhythm, normal S1, S2, no murmurs, rubs, clicks or gallops  Lung exam: clear to auscultation bilaterally  Neuro: no gross deficits  Mental Status exam: normal mood, behavior, and thought processes, able to follow commands, good eye contact   /77 (Site: Left Upper Arm, Position: Sitting, Cuff Size: Large Adult)   Pulse (!) 101   Ht 5' 10\" (1.778 m)   Wt 171 lb (77.6 kg)   SpO2 97%   BMI 24.54 kg/m²       An electronic signature was used to authenticate this note.   -- Leia Bourgeois, APRN - CNP Warm blanket and dre warmer gown place around right bicept for comfort

## 2023-03-20 ENCOUNTER — HOSPITAL ENCOUNTER (OUTPATIENT)
Dept: GENERAL RADIOLOGY | Age: 28
Discharge: HOME | End: 2023-03-22
Payer: COMMERCIAL

## 2023-03-20 ENCOUNTER — OFFICE VISIT (OUTPATIENT)
Dept: PRIMARY CARE CLINIC | Age: 28
End: 2023-03-20
Payer: COMMERCIAL

## 2023-03-20 ENCOUNTER — HOSPITAL ENCOUNTER (OUTPATIENT)
Age: 28
Discharge: HOME | End: 2023-03-22
Payer: COMMERCIAL

## 2023-03-20 VITALS
WEIGHT: 175.8 LBS | TEMPERATURE: 98.6 F | BODY MASS INDEX: 25.17 KG/M2 | HEIGHT: 70 IN | HEART RATE: 88 BPM | DIASTOLIC BLOOD PRESSURE: 76 MMHG | OXYGEN SATURATION: 98 % | SYSTOLIC BLOOD PRESSURE: 121 MMHG

## 2023-03-20 DIAGNOSIS — G89.29 CHRONIC NECK PAIN: Primary | ICD-10-CM

## 2023-03-20 DIAGNOSIS — G89.29 CHRONIC NECK PAIN: ICD-10-CM

## 2023-03-20 DIAGNOSIS — M54.2 CHRONIC NECK PAIN: Primary | ICD-10-CM

## 2023-03-20 DIAGNOSIS — M54.2 CHRONIC NECK PAIN: ICD-10-CM

## 2023-03-20 PROCEDURE — 72040 X-RAY EXAM NECK SPINE 2-3 VW: CPT

## 2023-03-20 PROCEDURE — 99213 OFFICE O/P EST LOW 20 MIN: CPT | Performed by: NURSE PRACTITIONER

## 2023-03-20 SDOH — ECONOMIC STABILITY: FOOD INSECURITY: WITHIN THE PAST 12 MONTHS, YOU WORRIED THAT YOUR FOOD WOULD RUN OUT BEFORE YOU GOT MONEY TO BUY MORE.: NEVER TRUE

## 2023-03-20 SDOH — ECONOMIC STABILITY: HOUSING INSECURITY
IN THE LAST 12 MONTHS, WAS THERE A TIME WHEN YOU DID NOT HAVE A STEADY PLACE TO SLEEP OR SLEPT IN A SHELTER (INCLUDING NOW)?: NO

## 2023-03-20 SDOH — ECONOMIC STABILITY: FOOD INSECURITY: WITHIN THE PAST 12 MONTHS, THE FOOD YOU BOUGHT JUST DIDN'T LAST AND YOU DIDN'T HAVE MONEY TO GET MORE.: NEVER TRUE

## 2023-03-20 SDOH — ECONOMIC STABILITY: INCOME INSECURITY: HOW HARD IS IT FOR YOU TO PAY FOR THE VERY BASICS LIKE FOOD, HOUSING, MEDICAL CARE, AND HEATING?: NOT HARD AT ALL

## 2023-03-20 NOTE — PROGRESS NOTES
Patient in office to discuss neck injury- 6 months ago hit neck at work, ever since then it spasms, headaches    Visit Information    Have you changed or started any medications since your last visit including any over-the-counter medicines, vitamins, or herbal medicines? no   Have you stopped taking any of your medications? Is so, why? -  no  Are you having any side effects from any of your medications? - no    Have you seen any other physician or provider since your last visit?  no   Have you had any other diagnostic tests since your last visit?  no   Have you been seen in the emergency room and/or had an admission in a hospital since we last saw you?  no   Have you had your routine dental cleaning in the past 6 months?  no     Do you have an active MyChart account? If no, what is the barrier?   Yes    Patient Care Team:  BREE Newby CNP as PCP - General (Family Medicine)  BREE Newby CNP as PCP - Empaneled Provider    Medical History Review  Past Medical, Family, and Social History reviewed and does not contribute to the patient presenting condition    Health Maintenance   Topic Date Due    HIV screen  Never done    Hepatitis C screen  Never done    COVID-19 Vaccine (4 - Booster for Javier Peter series) 01/12/2022    Depression Screen  01/27/2024    DTaP/Tdap/Td vaccine (8 - Td or Tdap) 11/28/2030    Hepatitis A vaccine  Completed    Hib vaccine  Completed    HPV vaccine  Completed    Varicella vaccine  Completed    Flu vaccine  Completed    Meningococcal (ACWY) vaccine  Aged Out    Pneumococcal 0-64 years Vaccine  Aged Out
18 03/03/2020 05:35 PM    CREATININE 0.93 03/03/2020 05:35 PM    GLUCOSE 126 03/03/2020 05:35 PM    GLUCOSE 101 01/11/2012 04:57 AM       HEMOGLOBIN A1C: No results found for: LABA1C    FASTING LIPID PANEL:No results found for: CHOL, HDL, TRIG    Physical Exam  Constitutional:       Appearance: Normal appearance. HENT:      Mouth/Throat:      Mouth: Mucous membranes are moist.   Eyes:      General: No scleral icterus. Pupils: Pupils are equal, round, and reactive to light. Neck:      Thyroid: No thyroid mass. Trachea: Trachea normal.   Musculoskeletal:      Cervical back: Normal range of motion and neck supple. No rigidity. No pain with movement, spinous process tenderness or muscular tenderness. Normal range of motion. Lymphadenopathy:      Cervical: No cervical adenopathy. Neurological:      Mental Status: He is alert. An electronic signature was used to authenticate this note.     --Renetta Burroughs, APRN - CNP

## 2023-03-22 ENCOUNTER — PATIENT MESSAGE (OUTPATIENT)
Dept: PRIMARY CARE CLINIC | Age: 28
End: 2023-03-22

## 2023-03-22 DIAGNOSIS — F90.0 ADHD, PREDOMINANTLY INATTENTIVE TYPE: ICD-10-CM

## 2023-03-22 RX ORDER — METHYLPHENIDATE HYDROCHLORIDE 36 MG/1
72 TABLET ORAL DAILY
Qty: 60 TABLET | Refills: 0 | Status: SHIPPED | OUTPATIENT
Start: 2023-03-22 | End: 2023-04-21

## 2023-03-22 NOTE — TELEPHONE ENCOUNTER
From: Kristina Eubanks  To: Dariel Mackenzie  Sent: 3/22/2023 10:43 AM EDT  Subject: Concerta    I called the pharmacy for my refill today and they said that I was out of refills. I thought that I had one more. Would you please have someone check for me?

## 2023-03-24 DIAGNOSIS — F90.0 ADHD (ATTENTION DEFICIT HYPERACTIVITY DISORDER), INATTENTIVE TYPE: ICD-10-CM

## 2023-03-24 RX ORDER — DEXMETHYLPHENIDATE HYDROCHLORIDE 10 MG/1
10 TABLET ORAL DAILY PRN
Qty: 20 TABLET | Refills: 0 | Status: SHIPPED | OUTPATIENT
Start: 2023-03-24 | End: 2023-04-23

## 2023-04-20 ENCOUNTER — HOSPITAL ENCOUNTER (OUTPATIENT)
Dept: PHYSICAL THERAPY | Facility: CLINIC | Age: 28
Setting detail: THERAPIES SERIES
Discharge: HOME | End: 2023-04-20
Payer: COMMERCIAL

## 2023-04-20 PROCEDURE — 97140 MANUAL THERAPY 1/> REGIONS: CPT

## 2023-04-20 PROCEDURE — 97161 PT EVAL LOW COMPLEX 20 MIN: CPT

## 2023-04-20 NOTE — CONSULTS
throughout to safely return to recreational activities  Pt will report minimal to no increased of neck pain after work day to indicate improved deep neck flexors function and endurance   Pt will be able to maintain proper upright posture for at least 30 mins without cues provided to demonstrate improved postural awareness  Patient to be independent with home exercise program as demonstrated by performance with correct form without cues. Pt will improve NDI score from 36% impaired to less than 20% impaired to demo improved functional mobility to participate in daily functional activities    Patient goals: \"to not be miserable, sleeping, and lifting weight again\"        Rehab Potential:  [x] Good  [] Fair  [] Poor   Suggested Professional Referral:  [x] No  [] Yes:  Barriers to Goal Achievement:  [x] No  [] Yes:  Domestic Concerns:  [x] No  [] Yes:      Pt. Education:  [x] Plans/Goals, Risks/Benefits discussed  [x] Home exercise program  Method of Education: [x] Verbal  [x] Demo  [x] Written  Comprehension of Education:  [x] Verbalizes understanding. [x] Demonstrates understanding. [] Needs Review. [] Demonstrates/verbalizes understanding of HEP/Ed previously given. 4/20/23: scapular clock bilateral - 3x each       Access Code: [email or web address]  URL: [email or web address]  Date: 04/20/2023  Prepared by:  Chela Martin    Exercises  - Standing Cervical Retraction  - 1 x daily - 7 x weekly - 2 sets - 10 reps - 5sec hold  - Seated Levator Scapulae Stretch  - 1 x daily - 7 x weekly - 1 sets - 3 reps - 30sec hold  - Supine Anterior Scalene Stretch  - 1 x daily - 7 x weekly - 1 sets - 3 reps - 30sec hold  - Supine Posterior Scalene Stretch  - 1 x daily - 7 x weekly - 1 sets - 3 reps - 30sec hold  - Seated Cervical Sidebending Stretch  - 1 x daily - 7 x weekly - 1 sets - 3 reps - 30sec hold  - Doorway Pec Stretch at 90 Degrees Abduction  - 1 x daily - 7 x weekly - 1 sets - 3 reps - 30sec hold        Treatment Plan:  [x]

## 2023-04-24 ENCOUNTER — HOSPITAL ENCOUNTER (OUTPATIENT)
Dept: PHYSICAL THERAPY | Facility: CLINIC | Age: 28
Setting detail: THERAPIES SERIES
Discharge: HOME | End: 2023-04-24
Payer: COMMERCIAL

## 2023-04-24 PROCEDURE — 97140 MANUAL THERAPY 1/> REGIONS: CPT

## 2023-04-24 PROCEDURE — 97110 THERAPEUTIC EXERCISES: CPT

## 2023-04-24 NOTE — FLOWSHEET NOTE
Scapular clock  1x ea    Full clock    ER 20x  3# Towel tuck under elbow         Supine       Ceiling punches + rhythmic stabilization 2x10 ea  3#   10s  Rhythmic stab 10s after each sep         Standing       Shoulder row 20x Lime     Shoulder ext 20x  Lime                       Postural education x       Other:     Specific Instructions for next treatment:  - Progressive strengthening of deep neck flexors, BUE, and scapulothoracic stabilizers   - Improve cervical ms flexibility   - Manual to SOR, scalenes, SCM, pec majors/minors   - CP/HP as needed       Treatment Charges: Mins Units   []  Modalities     [x]  Ther Exercise 41 3   [x]  Manual Therapy 12 1   []  Ther Activities     []  Aquatics     []  Vasocompression     []  Other     Total Treatment time 53 4       Assessment: [x] Progressing toward goals. Began session with cervical stretching exs and seated chin tuck to improve tissue extensibility and deep neck flexors activation followed by 12 mins of manual to reduce muscular tension and guarding. Pt with good relief and denied inc of pain/headache following manual.  Pt with improved execution with scapular clock and reduced cuing provided in comparison to initial evaluation. Intermittent cues provided throughout session for proper form and technique. End session with shoulder row/ext focus on maintaining proper KRIS, core stability, and avoid anterior trunk lean to prevent lumbar strain. Pt denied pain post-exs therefore deferred CP/HP management. Will monitor symptoms and progress as able. [] No change. [] Other:  [x] Patient would continue to benefit from skilled physical therapy services in order to: manage pain, improve tissue extensibility of cervical spine, improve functional strength and stability of BUE, and promote proper posture to assist pt in improving quality of sleep and returning to weight lifting activities at the gym. Problems:    [x] ?  Pain: 1/10 at best, up to 10/10

## 2023-04-27 ENCOUNTER — OFFICE VISIT (OUTPATIENT)
Dept: PRIMARY CARE CLINIC | Age: 28
End: 2023-04-27
Payer: COMMERCIAL

## 2023-04-27 VITALS
HEART RATE: 91 BPM | WEIGHT: 177 LBS | DIASTOLIC BLOOD PRESSURE: 75 MMHG | SYSTOLIC BLOOD PRESSURE: 112 MMHG | BODY MASS INDEX: 25.4 KG/M2 | OXYGEN SATURATION: 96 %

## 2023-04-27 DIAGNOSIS — F90.0 ADHD, PREDOMINANTLY INATTENTIVE TYPE: Primary | ICD-10-CM

## 2023-04-27 PROCEDURE — 99213 OFFICE O/P EST LOW 20 MIN: CPT | Performed by: NURSE PRACTITIONER

## 2023-04-27 RX ORDER — METHYLPHENIDATE HYDROCHLORIDE 36 MG/1
72 TABLET ORAL DAILY
Qty: 60 TABLET | Refills: 0 | Status: SHIPPED | OUTPATIENT
Start: 2023-05-27 | End: 2023-06-26

## 2023-04-27 RX ORDER — METHYLPHENIDATE HYDROCHLORIDE 36 MG/1
72 TABLET ORAL DAILY
Qty: 60 TABLET | Refills: 0 | Status: SHIPPED | OUTPATIENT
Start: 2023-04-27 | End: 2023-05-27

## 2023-04-27 RX ORDER — METHYLPHENIDATE HYDROCHLORIDE 36 MG/1
72 TABLET ORAL DAILY
Qty: 30 TABLET | Refills: 0 | Status: SHIPPED | OUTPATIENT
Start: 2023-06-26 | End: 2023-07-26

## 2023-04-27 RX ORDER — METHYLPHENIDATE HYDROCHLORIDE 36 MG/1
72 TABLET ORAL DAILY
Qty: 60 TABLET | Refills: 0 | Status: CANCELLED | OUTPATIENT
Start: 2023-06-26 | End: 2023-07-26

## 2023-04-27 NOTE — PROGRESS NOTES
Paulette Barrientos (: 1995) is a 34 y.o. male is here for evaluation of the following chief complaint(s): ADHD (Patient is here for 3 month follow up.)    Assessment/Plan:   1. ADHD, predominantly inattentive type  -     methylphenidate (CONCERTA) 36 MG extended release tablet; Take 2 tablets by mouth daily for 30 days. Max Daily Amount: 72 mg, Disp-60 tablet, R-0Normal  -     methylphenidate (CONCERTA) 36 MG extended release tablet; Take 2 tablets by mouth daily for 30 days. Max Daily Amount: 72 mg, Disp-60 tablet, R-0Normal  -     methylphenidate (CONCERTA) 36 MG extended release tablet; Take 2 tablets by mouth daily for 30 days. Max Daily Amount: 72 mg, Disp-30 tablet, R-0Normal  No follow-ups on file. Subjective/Objective:   Since last visit: no change. Medication compliance: holds meds on days off work. Side effects from medication include: none.  has been reviewed. A comprehensive review of systems was negative. Exam:  General: alert, appears stated age, and cooperative  Heart exam: normal rate, regular rhythm, normal S1, S2, no murmurs, rubs, clicks or gallops  Lung exam: clear to auscultation bilaterally  Mental Status exam: normal mood, behavior, and thought processes, able to follow commands, well dressed, good eye contact, fluent speech   /75   Pulse 91   Wt 177 lb (80.3 kg)   SpO2 96%   BMI 25.40 kg/m²       An electronic signature was used to authenticate this note.   -- Dinora Duty, APRN - CNP

## 2023-05-03 NOTE — TELEPHONE ENCOUNTER
New prescription sent for 36 mg dose, to take 2 tablets daily for total of 72. Notify patient of changes.
Pharmacy called. Patient's prescription for Methylphenidate HCL ER 72 mg as written will be over $500 a month, but if written to take 2 tablets of 36 mg of same medication, would be only $10 a month. Can prescription be rewritten for 2 of 36 mg daily and for 60 tablets instead of 30?
Pt called back and has been informed
Writer placed call to patient. LVM for a return call regarding medication change.
Vaccine status unknown

## 2023-05-08 ENCOUNTER — HOSPITAL ENCOUNTER (OUTPATIENT)
Dept: PHYSICAL THERAPY | Facility: CLINIC | Age: 28
Setting detail: THERAPIES SERIES
Discharge: HOME | End: 2023-05-08
Payer: COMMERCIAL

## 2023-05-08 NOTE — FLOWSHEET NOTE
[] [facility]) [facility] &  Therapy  [address]    [phone]  F: [phone]   [x] [address]  [facility] 36   [address]  P: [phone]  F: [phone]  [] [address] &  Therapy  [address]  P: [phone]  F: [phone] [] [address]  P: [phone]  F: [phone]  [] [address] [facility]   [address]   [place]: [phone]  F: [phone]   [] [place] [id number] [facility] [address]  [place]: [phone]   F: [phone]     Physical Therapy Cancel/No Show note    Date: 2023  Patient: Bruce England  : 1995  MRN: [id number]    Cancels/No Shows to date:     For today's appointment patient:    [x]  Cancelled    [] Rescheduled appointment    [] No-show     Reason given by patient:    [x]  Patient ill    []  Conflicting appointment    [] No transportation      [] Conflict with work    [] No reason given    [] Weather related    [] COVID-19    [] Other:      Comments:        [] Next appointment was confirmed    Electronically signed by:  Chela Green PT

## 2023-05-11 ENCOUNTER — OFFICE VISIT (OUTPATIENT)
Dept: PRIMARY CARE CLINIC | Age: 28
End: 2023-05-11
Payer: COMMERCIAL

## 2023-05-11 VITALS
OXYGEN SATURATION: 99 % | SYSTOLIC BLOOD PRESSURE: 124 MMHG | TEMPERATURE: 97.4 F | DIASTOLIC BLOOD PRESSURE: 79 MMHG | HEART RATE: 69 BPM

## 2023-05-11 DIAGNOSIS — J02.0 ACUTE STREPTOCOCCAL PHARYNGITIS: Primary | ICD-10-CM

## 2023-05-11 DIAGNOSIS — J02.9 SORE THROAT: ICD-10-CM

## 2023-05-11 LAB — S PYO AG THROAT QL: POSITIVE

## 2023-05-11 PROCEDURE — 87880 STREP A ASSAY W/OPTIC: CPT | Performed by: NURSE PRACTITIONER

## 2023-05-11 PROCEDURE — 99213 OFFICE O/P EST LOW 20 MIN: CPT | Performed by: NURSE PRACTITIONER

## 2023-05-11 RX ORDER — AMOXICILLIN 500 MG/1
500 CAPSULE ORAL 2 TIMES DAILY
Qty: 20 CAPSULE | Refills: 0 | Status: SHIPPED | OUTPATIENT
Start: 2023-05-11 | End: 2023-05-21

## 2023-05-11 RX ORDER — PREDNISONE 20 MG/1
40 TABLET ORAL DAILY
Qty: 6 TABLET | Refills: 0 | Status: SHIPPED | OUTPATIENT
Start: 2023-05-11 | End: 2023-05-14

## 2023-05-11 ASSESSMENT — ENCOUNTER SYMPTOMS
COUGH: 1
SINUS PRESSURE: 0
VOICE CHANGE: 0
SORE THROAT: 1
CHEST TIGHTNESS: 0
SHORTNESS OF BREATH: 0
EYE DISCHARGE: 0
WHEEZING: 0
EYE REDNESS: 0

## 2023-05-11 NOTE — PROGRESS NOTES
[address] WALK IN CARE  [address] [place] [address]  Dept: [phone]  Dept Fax: [phone]     Faisal Queen is a 34 y.o. male who presents to the urgent care today for his medicalconditions/complaints as noted below. Faisal Queen is c/o of Pharyngitis (With cough and nasal congestion - started approx 4 days ago)    HPI:      Pharyngitis  This is a new problem. Episode onset: 4 days ago. The problem has been unchanged. Associated symptoms include congestion, coughing and a sore throat. Pertinent negatives include no chest pain, chills, fatigue, fever, headaches, myalgias, rash or weakness. The symptoms are aggravated by drinking, eating and swallowing. Treatments tried: otc tx. The treatment provided no relief. Past Medical History:   Diagnosis Date    ADHD (attention deficit hyperactivity disorder), inattentive type     diagnosed in 2011, had an eval done through school psychologist    Reactive airway disease without complication       Current Outpatient Medications   Medication Sig Dispense Refill    amoxicillin (AMOXIL) 500 MG capsule Take 1 capsule by mouth 2 times daily for 10 days 20 capsule 0    predniSONE (DELTASONE) 20 MG tablet Take 2 tablets by mouth daily for 3 days 6 tablet 0    methylphenidate (CONCERTA) 36 MG extended release tablet Take 2 tablets by mouth daily for 30 days. Max Daily Amount: 72 mg 60 tablet 0    mometasone (ELOCON) 0.1 % cream Apply topically daily. (Patient taking differently: as needed Apply topically daily.) 45 g 0    Multiple Vitamin (MULTI-VITAMIN DAILY) TABS Take by mouth      albuterol sulfate HFA (VENTOLIN HFA) 108 (90 Base) MCG/ACT inhaler Inhale 2 puffs into the lungs 4 times daily as needed for Wheezing 18 g 0    [START ON 5/27/2023] methylphenidate (CONCERTA) 36 MG extended release tablet Take 2 tablets by mouth daily for 30 days.  Max Daily Amount: 72 mg 60 tablet 0    [START ON

## 2023-05-12 ENCOUNTER — HOSPITAL ENCOUNTER (OUTPATIENT)
Dept: PHYSICAL THERAPY | Facility: CLINIC | Age: 28
Setting detail: THERAPIES SERIES
Discharge: HOME | End: 2023-05-12
Payer: COMMERCIAL

## 2023-05-12 PROCEDURE — 97110 THERAPEUTIC EXERCISES: CPT

## 2023-05-12 NOTE — FLOWSHEET NOTE
[] [facility]  [address]  [phone]  F: [phone] [x] [address]  [facility] 36   [address]  P: [phone]  F: [phone] [] [place] &  Therapy  [address]  P: [phone]  F: [phone] [] [address]  P: [phone]  F: [phone] [] [address]  [facility]   [address]   [place]: [phone]  F: [phone]      Physical Therapy Daily Treatment Note    Date:  2023  Patient Name:  Dustin Feliz    :  1995  MRN: [id number]  Physician: BREE Rosas - YARON                Insurance: [facility] (Gina Bermudez, No Auth Required)  Medical Diagnosis: M54.2, G89.29 (ICD-10-CM) - Chronic neck pain  Rehab Codes: M25.60, M54.2, R29.3, M62.81, M62.838, G44.20  Onset Date: 23                 Next 's appt.: TBD     Visit# / total visits: 3/8    Cancels/No Shows: 1/0      Subjective:    Pain:  [] Yes  [x] No  Location: N/A  Pain Rating: (0-10 scale) 0/10  Pain altered Tx:  [] No  [] Yes  Action:  Comments: Pt arrived to physical therapy deny pain. Pt reported is still recovering from \"something similar to strep throat\". Pt reported has been compliance with HEP and believes therapy is helpful, noted has not having a lot of neck symptoms with reduced time taking Advil, noted has not been having headache either for the past couple days.         Objective:  Modalities:   Precautions:  Exercises: complete BILATERAL unless specify   Exercise Reps/ Time Weight/ Level Comments             Manual (supine) 10'   SOR  Passive stretch of SCM, scalenes, and upper trap with 1st rib mob              Seated          Chin tuck  20x5s       B UT/LS stretch 3x30s ea       B scalenes stretch  3x30s ea

## 2023-05-15 ENCOUNTER — HOSPITAL ENCOUNTER (OUTPATIENT)
Dept: PHYSICAL THERAPY | Facility: CLINIC | Age: 28
Setting detail: THERAPIES SERIES
Discharge: HOME | End: 2023-05-15
Payer: COMMERCIAL

## 2023-05-15 PROCEDURE — 97110 THERAPEUTIC EXERCISES: CPT

## 2023-05-15 NOTE — FLOWSHEET NOTE
[] [facility]  [address]  [phone]  F: [phone] [x] [address]  [facility] 36   [address]  P: [phone]  F: [phone] [] [place] &  Therapy  [address]  P: [phone]  F: [phone] [] [address]  P: [phone]  F: [phone] [] [address]  [facility]   [address]   [place]: [phone]  F: [phone]      Physical Therapy Daily Treatment Note    Date:  5/15/2023  Patient Name:  Claudine Flynn    :  1995  MRN: [id number]  Physician: BREE Pedro - YARON                Insurance: [facility] (Helen Calero, No Auth Required)  Medical Diagnosis: M54.2, G89.29 (ICD-10-CM) - Chronic neck pain  Rehab Codes: M25.60, M54.2, R29.3, M62.81, M62.838, G44.20  Onset Date: 23                 Next 's appt.: TBD     Visit# / total visits:     Cancels/No Shows: 1/0      Subjective:    Pain:  [] Yes  [x] No  Location: N/A  Pain Rating: (0-10 scale) 0/10  Pain altered Tx:  [] No  [] Yes  Action:  Comments: Pt arrived to physical therapy deny pain. Pt reported has not been having headache, noted went to the gym for short period of time yesterday - completed triceps and biceps pull without any issue noted.        Objective:  Modalities:   Precautions:  Exercises: complete BILATERAL unless specify   Exercise Reps/ Time Weight/ Level Comments             Manual (supine) 8'   SOR  Passive stretch of SCM, scalenes, and upper trap with 1st rib mob              Seated          Chin tuck  20x5s [place]  Added 5/15   B UT/LS stretch 3x30s ea       B scalenes stretch  3x30s ea              Prone      Y/T/A/W 20x 3# Inc wt & added W ; inc wt 5/15   Row  20x  1#    Plank on forearm  2x  New

## 2023-05-24 ENCOUNTER — HOSPITAL ENCOUNTER (OUTPATIENT)
Dept: PHYSICAL THERAPY | Facility: CLINIC | Age: 28
Setting detail: THERAPIES SERIES
Discharge: HOME | End: 2023-05-24
Payer: COMMERCIAL

## 2023-05-24 PROCEDURE — 97110 THERAPEUTIC EXERCISES: CPT

## 2023-05-24 NOTE — FLOWSHEET NOTE
[] [facility]  [address]  [phone]  F: [phone] [x] [address]  [facility] 36   [address]  P: [phone]  F: [phone] [] [place] &  Therapy  [address] [facility]  P: [phone]  F: [phone] [] [address]  P: [phone]  F: [phone] [] [address]  [facility]   [address]   [place]: [phone]  F: [phone]      Physical Therapy Daily Treatment Note    Date:  2023  Patient Name:  Fátima Gonzalez    :  1995  MRN: [id number]  Physician: BREE Ragland - YARON                Insurance: [facility] (Francisco Bile, No Auth Required)  Medical Diagnosis: M54.2, G89.29 (ICD-10-CM) - Chronic neck pain  Rehab Codes: M25.60, M54.2, R29.3, M62.81, M62.838, G44.20  Onset Date: 23                 Next 's appt.: TBD     Visit# / total visits:     Cancels/No Shows: 1/0      Subjective:    Pain:  [] Yes  [x] No  Location: N/A  Pain Rating: (0-10 scale) 0/10  Pain altered Tx:  [] No  [] Yes  Action:  Comments: Pt arrived to physical therapy continued to have no c/o pain. Pt reported started working out again doing BUE and BLE strength training but has been avoiding squat and bench press using rack. Pt reported reduced taking Advil from 3+ daily to 1-2 in the past week, noted had 1x headache due to drastic change in weather.         Objective:  Modalities:   Precautions:  Exercises: complete BILATERAL unless specify   Exercise Reps/ Time Weight/ Level Comments             Manual (supine) 8'   SOR  Passive stretch of SCM, scalenes, and upper trap with 1st rib mob              Seated          Chin tuck  20x5s [place]  Added 5/15; inc res 3/24   B UT/LS stretch 3x30s ea       B scalenes

## 2023-05-26 ENCOUNTER — HOSPITAL ENCOUNTER (OUTPATIENT)
Dept: PHYSICAL THERAPY | Facility: CLINIC | Age: 28
Setting detail: THERAPIES SERIES
Discharge: HOME | End: 2023-05-26
Payer: COMMERCIAL

## 2023-05-26 PROCEDURE — 97110 THERAPEUTIC EXERCISES: CPT

## 2023-05-26 NOTE — FLOWSHEET NOTE
[] [facility]  [address]  [phone]  F: [phone] [x] [address]  [facility] 36   [address]  P: [phone]  F: [phone] [] [place] &  Therapy  [address] [facility]  P: [phone]  F: [phone] [] [address]  P: [phone]  F: [phone] [] [address]  [facility]   [address]   [place]: [phone]  F: [phone]      Physical Therapy Daily Treatment Note    Date:  2023  Patient Name:  Silvio Unger    :  1995  MRN: [id number]  Physician: BREE Win CNP                Insurance: [facility] (Gabrielle Hughes, No Auth Required)  Medical Diagnosis: M54.2, G89.29 (ICD-10-CM) - Chronic neck pain  Rehab Codes: M25.60, M54.2, R29.3, M62.81, M62.838, G44.20  Onset Date: 23                 Next 's appt.: TBD     Visit# / total visits:     Cancels/No Shows: 1/0      Subjective:    Pain:  [] Yes  [x] No  Location: N/A  Pain Rating: (0-10 scale) 0/10  Pain altered Tx:  [] No  [] Yes  Action:  Comments: Pt arrived to physical therapy denied pain. 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       Objective:  Modalities:   Precautions:  Exercises: complete BILATERAL unless specify   Exercise Reps/ Time Weight/ Level Comments             Manual (supine) 8'   SOR  Passive stretch of SCM, scalenes, and upper trap with 1st rib mob              Seated          Chin tuck  20x5s [place]  Added 5/15; inc res 3/24   B UT/LS stretch 3x30s ea       B scalenes stretch  3x30s ea              Prone      Y/T/A/W 20x 3# Inc wt & added W 5/12; inc wt 5/15; on big red physioball

## 2023-06-07 ENCOUNTER — HOSPITAL ENCOUNTER (OUTPATIENT)
Dept: PHYSICAL THERAPY | Facility: CLINIC | Age: 28
Setting detail: THERAPIES SERIES
Discharge: HOME | End: 2023-06-07
Payer: COMMERCIAL

## 2023-06-07 PROCEDURE — 97110 THERAPEUTIC EXERCISES: CPT

## 2023-06-07 NOTE — FLOWSHEET NOTE
[] [facility]  [address]  [phone]  F: [phone] [x] [address]  [facility] 36   [address]  P: [phone]  F: [phone] [] [address]  [address]  P: [phone]  F: [phone] [] [address]  P: [phone]  F: [phone] [] [address]  [facility]   [address]   [place]: [phone]  F: [phone]      Physical Therapy Daily Treatment Note    Date:  2023  Patient Name:  Gauri Knapp    :  1995  MRN: [id number]  Physician: BREE Killian - YARON                [facility] (Tiffanie Diaz, No Auth Required)  Medical Diagnosis: M54.2, G89.29 (ICD-10-CM) - Chronic neck pain  Rehab Codes: M25.60, M54.2, R29.3, M62.81, M62.838, G44.20  Onset Date: 23                 Next 's appt.: TBD     Visit# / total visits:     Cancels/No Shows: 1/0      Subjective:    Pain:  [] Yes  [x] No  Location: N/A  Pain Rating: (0-10 scale) 0/10  Pain altered Tx:  [] No  [] Yes  Action:  Comments: Pt arrived to physical therapy 15 mins late but able to accommodate - pt thought appointment time was at 2:30 pm.  Pt reported did HEP while on vacation, noted mild pain in L shoulder blade when arriving to [facility] but has resolved.         Objective:  Modalities:   Precautions:  Exercises: complete BILATERAL unless specify  Exercise Reps/ Time Weight/ Level Comments             Manual (supine) 8'   SOR  Passive stretch of SCM, scalenes, and upper trap with 1st rib mob              Seated          Chin tuck  20x5s [place]  Added 5/15; inc res 3/24   B UT/LS stretch 3x30s ea       B scalenes stretch  3x30s ea              Prone      Y/T/A/W 20x 3# Inc wt & added W ; inc wt 5/15; on

## 2023-07-13 DIAGNOSIS — F90.0 ADHD, PREDOMINANTLY INATTENTIVE TYPE: ICD-10-CM

## 2023-07-13 RX ORDER — METHYLPHENIDATE HYDROCHLORIDE 36 MG/1
TABLET ORAL
Qty: 60 TABLET | Refills: 0 | Status: SHIPPED | OUTPATIENT
Start: 2023-07-13 | End: 2023-08-12

## 2023-07-25 DIAGNOSIS — F90.0 ADHD, PREDOMINANTLY INATTENTIVE TYPE: ICD-10-CM

## 2023-07-25 RX ORDER — METHYLPHENIDATE HYDROCHLORIDE 36 MG/1
72 TABLET ORAL DAILY
Qty: 30 TABLET | Refills: 0 | Status: SHIPPED | OUTPATIENT
Start: 2023-07-25 | End: 2023-08-24

## 2023-07-25 NOTE — TELEPHONE ENCOUNTER
Mom called in stating he will be out of town on Thursday and will not be able to make his refill appointment. She wants to know if he can be scheduled Friday. Writer checked for available appointments and nothing available to August, patient needs concerta refill for one month and he will call to get appointment scheduled as soon as possible.  Please advise

## 2023-08-14 ENCOUNTER — OFFICE VISIT (OUTPATIENT)
Dept: PRIMARY CARE CLINIC | Age: 28
End: 2023-08-14
Payer: COMMERCIAL

## 2023-08-14 VITALS
DIASTOLIC BLOOD PRESSURE: 83 MMHG | WEIGHT: 189 LBS | OXYGEN SATURATION: 98 % | BODY MASS INDEX: 27.12 KG/M2 | SYSTOLIC BLOOD PRESSURE: 138 MMHG | HEART RATE: 108 BPM

## 2023-08-14 DIAGNOSIS — F90.0 ADHD (ATTENTION DEFICIT HYPERACTIVITY DISORDER), INATTENTIVE TYPE: ICD-10-CM

## 2023-08-14 DIAGNOSIS — F90.0 ADHD, PREDOMINANTLY INATTENTIVE TYPE: Primary | ICD-10-CM

## 2023-08-14 PROCEDURE — 99213 OFFICE O/P EST LOW 20 MIN: CPT | Performed by: NURSE PRACTITIONER

## 2023-08-14 RX ORDER — METHYLPHENIDATE HYDROCHLORIDE 36 MG/1
72 TABLET ORAL DAILY
Qty: 60 TABLET | Refills: 0 | Status: SHIPPED | OUTPATIENT
Start: 2023-10-13 | End: 2023-11-12

## 2023-08-14 RX ORDER — METHYLPHENIDATE HYDROCHLORIDE 36 MG/1
72 TABLET ORAL DAILY
Qty: 60 TABLET | Refills: 0 | Status: SHIPPED | OUTPATIENT
Start: 2023-09-13 | End: 2023-10-13

## 2023-08-14 RX ORDER — DEXMETHYLPHENIDATE HYDROCHLORIDE 10 MG/1
10 TABLET ORAL DAILY PRN
Qty: 20 TABLET | Refills: 0 | Status: SHIPPED | OUTPATIENT
Start: 2023-08-14 | End: 2023-09-13

## 2023-08-14 RX ORDER — METHYLPHENIDATE HYDROCHLORIDE 36 MG/1
72 TABLET ORAL DAILY
Qty: 60 TABLET | Refills: 0 | Status: SHIPPED | OUTPATIENT
Start: 2023-08-14 | End: 2023-09-13

## 2023-10-02 DIAGNOSIS — F90.0 ADHD (ATTENTION DEFICIT HYPERACTIVITY DISORDER), INATTENTIVE TYPE: ICD-10-CM

## 2023-10-02 RX ORDER — DEXMETHYLPHENIDATE HYDROCHLORIDE 10 MG/1
TABLET ORAL
Qty: 20 TABLET | Refills: 0 | Status: SHIPPED | OUTPATIENT
Start: 2023-10-02 | End: 2023-11-01

## 2023-11-06 DIAGNOSIS — F90.0 ADHD (ATTENTION DEFICIT HYPERACTIVITY DISORDER), INATTENTIVE TYPE: ICD-10-CM

## 2023-11-06 RX ORDER — DEXMETHYLPHENIDATE HYDROCHLORIDE 10 MG/1
TABLET ORAL
Qty: 20 TABLET | Refills: 0 | Status: SHIPPED | OUTPATIENT
Start: 2023-11-06 | End: 2023-12-06

## 2023-11-07 ENCOUNTER — TELEPHONE (OUTPATIENT)
Dept: PRIMARY CARE CLINIC | Age: 28
End: 2023-11-07

## 2023-11-07 DIAGNOSIS — J32.9 BACTERIAL SINUSITIS: Primary | ICD-10-CM

## 2023-11-07 DIAGNOSIS — B96.89 BACTERIAL SINUSITIS: Primary | ICD-10-CM

## 2023-11-07 RX ORDER — AMOXICILLIN AND CLAVULANATE POTASSIUM 875; 125 MG/1; MG/1
1 TABLET, FILM COATED ORAL 2 TIMES DAILY
Qty: 14 TABLET | Refills: 0 | Status: SHIPPED | OUTPATIENT
Start: 2023-11-07 | End: 2023-11-14

## 2023-11-07 NOTE — TELEPHONE ENCOUNTER
Mom called on behalf of patient states 2 weeks ago he was diagnosed with sinus infection he was swabbed for strep and it was negative but was given amoxicillin for 10 days. He finish antibiotics 11/2/23 and he ws feeling better but feels sick still. He is having sore throat and going up into ears,hard to swallow, denies any fever chills nausea or vomiting. No headaches . He is coughing a lot but not coughing up anything .  Please advise     Priyank Yang

## 2023-11-07 NOTE — TELEPHONE ENCOUNTER
Please advise the patient I did send a higher dose of Augmentin in as he did have some initial response with amoxicillin.

## 2023-11-14 ENCOUNTER — HOSPITAL ENCOUNTER (OUTPATIENT)
Age: 28
Setting detail: SPECIMEN
Discharge: HOME | End: 2023-11-14

## 2023-11-14 ENCOUNTER — OFFICE VISIT (OUTPATIENT)
Dept: PRIMARY CARE CLINIC | Age: 28
End: 2023-11-14
Payer: COMMERCIAL

## 2023-11-14 VITALS
SYSTOLIC BLOOD PRESSURE: 116 MMHG | BODY MASS INDEX: 27.41 KG/M2 | HEART RATE: 92 BPM | WEIGHT: 191 LBS | DIASTOLIC BLOOD PRESSURE: 76 MMHG | OXYGEN SATURATION: 97 %

## 2023-11-14 DIAGNOSIS — Z02.83 ENCOUNTER FOR DRUG SCREENING: ICD-10-CM

## 2023-11-14 DIAGNOSIS — F90.0 ADHD, PREDOMINANTLY INATTENTIVE TYPE: ICD-10-CM

## 2023-11-14 DIAGNOSIS — F90.0 ADHD, PREDOMINANTLY INATTENTIVE TYPE: Primary | ICD-10-CM

## 2023-11-14 PROCEDURE — 90471 IMMUNIZATION ADMIN: CPT | Performed by: NURSE PRACTITIONER

## 2023-11-14 PROCEDURE — 90674 CCIIV4 VAC NO PRSV 0.5 ML IM: CPT | Performed by: NURSE PRACTITIONER

## 2023-11-14 PROCEDURE — 99213 OFFICE O/P EST LOW 20 MIN: CPT | Performed by: NURSE PRACTITIONER

## 2023-11-14 RX ORDER — METHYLPHENIDATE HYDROCHLORIDE 36 MG/1
72 TABLET ORAL DAILY
Qty: 60 TABLET | Refills: 0 | Status: SHIPPED | OUTPATIENT
Start: 2023-12-14 | End: 2024-01-13

## 2023-11-14 RX ORDER — METHYLPHENIDATE HYDROCHLORIDE 36 MG/1
72 TABLET ORAL DAILY
Qty: 60 TABLET | Refills: 0 | Status: SHIPPED | OUTPATIENT
Start: 2023-11-14 | End: 2023-12-14

## 2023-11-14 RX ORDER — METHYLPHENIDATE HYDROCHLORIDE 36 MG/1
72 TABLET ORAL DAILY
Qty: 60 TABLET | Refills: 0 | Status: SHIPPED | OUTPATIENT
Start: 2024-01-13 | End: 2024-02-12

## 2023-11-17 LAB
6-ACETYLMORPHINE, UR: NOT DETECTED
7-AMINOCLONAZEPAM, URINE: NOT DETECTED
ALPHA-OH-ALPRAZ, URINE: NOT DETECTED
ALPHA-OH-MIDAZOLAM, URINE: NOT DETECTED
ALPRAZOLAM, URINE: NOT DETECTED
AMPHETAMINES, URINE: NOT DETECTED
BARBITURATES, URINE: NEGATIVE
BENZOYLECGONINE, UR: NEGATIVE
BUPRENORPHINE URINE: NOT DETECTED
CARISOPRODOL, UR: NEGATIVE
CLONAZEPAM, URINE: NOT DETECTED
CODEINE, URINE: NOT DETECTED
CREATININE URINE: 225.6 MG/DL (ref 20–400)
DIAZEPAM, URINE: NOT DETECTED
EER PAIN MGT DRUG PANEL, HIGH RES/EMIT U: NORMAL
ETHYL GLUCURONIDE UR: NEGATIVE
FENTANYL URINE: NOT DETECTED
GABAPENTIN: NOT DETECTED
HYDROCODONE, OPI6M: NOT DETECTED
HYDROMORPHONE, OPI3M: NOT DETECTED
LORAZEPAM, URINE: NOT DETECTED
MARIJUANA METAB, UR: NEGATIVE
MDA, UR: NOT DETECTED
MDEA, EVE, UR: NOT DETECTED
MDMA URINE: NOT DETECTED
MEPERIDINE METAB, UR: NOT DETECTED
METHADONE, URINE: NEGATIVE
METHAMPHETAMINE, URINE: NOT DETECTED
METHYLPHENIDATE: NOT DETECTED
MIDAZOLAM, URINE: NOT DETECTED
MORPHINE, OPI1M: NOT DETECTED
NALOXONE URINE: NOT DETECTED
NORBUPRENORPHINE, URINE: NOT DETECTED
NORDIAZEPAM, URINE: NOT DETECTED
NORFENTANYL, URINE: NOT DETECTED
NORHYDROCODONE, URINE: NOT DETECTED
NOROXYCODONE, URINE: NOT DETECTED
NOROXYMORPHONE, URINE: NOT DETECTED
OXAZEPAM, URINE: NOT DETECTED
OXYCODONE URINE: NOT DETECTED
OXYMORPHONE, URINE: NOT DETECTED
PAIN MGT DRUG PANEL, HI RES, UR: NORMAL
PCP,URINE: NEGATIVE
PHENTERMINE, UR: NOT DETECTED
PREGABALIN: NOT DETECTED
TAPENTADOL, URINE: NOT DETECTED
TAPENTADOL-O-SULFATE, URINE: NOT DETECTED
TEMAZEPAM, URINE: NOT DETECTED
TRAMADOL, URINE: NORMAL
ZOLPIDEM METABOLITE (ZCA), URINE: NOT DETECTED
ZOLPIDEM, URINE: NOT DETECTED

## 2024-02-25 ENCOUNTER — PATIENT MESSAGE (OUTPATIENT)
Dept: PRIMARY CARE CLINIC | Age: 29
End: 2024-02-25

## 2024-02-25 DIAGNOSIS — F90.0 ADHD, PREDOMINANTLY INATTENTIVE TYPE: ICD-10-CM

## 2024-02-25 DIAGNOSIS — F90.0 ADHD (ATTENTION DEFICIT HYPERACTIVITY DISORDER), INATTENTIVE TYPE: ICD-10-CM

## 2024-02-26 RX ORDER — METHYLPHENIDATE HYDROCHLORIDE 36 MG/1
72 TABLET ORAL DAILY
Qty: 60 TABLET | Refills: 0 | Status: SHIPPED | OUTPATIENT
Start: 2024-02-26 | End: 2024-03-27

## 2024-02-26 RX ORDER — DEXMETHYLPHENIDATE HYDROCHLORIDE 10 MG/1
TABLET ORAL
Qty: 20 TABLET | Refills: 0 | Status: SHIPPED | OUTPATIENT
Start: 2024-02-26 | End: 2024-03-27

## 2024-02-26 NOTE — TELEPHONE ENCOUNTER
From: Tenzin Gaitan  To: Katie Hawk  Sent: 2/25/2024 1:12 PM EST  Subject: Concerta and Focalin    I just realized that I missed my appointment. I am so sorry! Too much overtime at work, I think. Would you please send in a 2 week refill and I will call to make my appointment with you tomorrow.

## 2024-03-25 DIAGNOSIS — F90.0 ADHD, PREDOMINANTLY INATTENTIVE TYPE: ICD-10-CM

## 2024-03-25 RX ORDER — METHYLPHENIDATE HYDROCHLORIDE 36 MG/1
72 TABLET ORAL DAILY
Qty: 60 TABLET | Refills: 0 | Status: SHIPPED | OUTPATIENT
Start: 2024-03-25 | End: 2024-04-24

## 2024-04-09 ENCOUNTER — OFFICE VISIT (OUTPATIENT)
Dept: PRIMARY CARE CLINIC | Age: 29
End: 2024-04-09
Payer: COMMERCIAL

## 2024-04-09 VITALS
WEIGHT: 192 LBS | HEIGHT: 70 IN | HEART RATE: 83 BPM | SYSTOLIC BLOOD PRESSURE: 110 MMHG | OXYGEN SATURATION: 96 % | BODY MASS INDEX: 27.49 KG/M2 | DIASTOLIC BLOOD PRESSURE: 76 MMHG

## 2024-04-09 DIAGNOSIS — F90.0 ADHD (ATTENTION DEFICIT HYPERACTIVITY DISORDER), INATTENTIVE TYPE: Primary | ICD-10-CM

## 2024-04-09 PROCEDURE — 99213 OFFICE O/P EST LOW 20 MIN: CPT | Performed by: NURSE PRACTITIONER

## 2024-04-09 RX ORDER — METHYLPHENIDATE HYDROCHLORIDE 36 MG/1
72 TABLET ORAL DAILY
Qty: 60 TABLET | Refills: 0 | Status: SHIPPED | OUTPATIENT
Start: 2024-06-08 | End: 2024-07-08

## 2024-04-09 RX ORDER — METHYLPHENIDATE HYDROCHLORIDE 36 MG/1
72 TABLET ORAL DAILY
Qty: 60 TABLET | Refills: 0 | Status: SHIPPED | OUTPATIENT
Start: 2024-04-09 | End: 2024-05-09

## 2024-04-09 RX ORDER — METHYLPHENIDATE HYDROCHLORIDE 36 MG/1
72 TABLET ORAL DAILY
Qty: 60 TABLET | Refills: 0 | Status: SHIPPED | OUTPATIENT
Start: 2024-05-09 | End: 2024-06-08

## 2024-04-09 SDOH — ECONOMIC STABILITY: FOOD INSECURITY: WITHIN THE PAST 12 MONTHS, YOU WORRIED THAT YOUR FOOD WOULD RUN OUT BEFORE YOU GOT MONEY TO BUY MORE.: NEVER TRUE

## 2024-04-09 SDOH — ECONOMIC STABILITY: FOOD INSECURITY: WITHIN THE PAST 12 MONTHS, THE FOOD YOU BOUGHT JUST DIDN'T LAST AND YOU DIDN'T HAVE MONEY TO GET MORE.: NEVER TRUE

## 2024-04-09 SDOH — ECONOMIC STABILITY: INCOME INSECURITY: HOW HARD IS IT FOR YOU TO PAY FOR THE VERY BASICS LIKE FOOD, HOUSING, MEDICAL CARE, AND HEATING?: NOT HARD AT ALL

## 2024-04-09 ASSESSMENT — PATIENT HEALTH QUESTIONNAIRE - PHQ9
SUM OF ALL RESPONSES TO PHQ QUESTIONS 1-9: 0
SUM OF ALL RESPONSES TO PHQ9 QUESTIONS 1 & 2: 0
SUM OF ALL RESPONSES TO PHQ QUESTIONS 1-9: 0
1. LITTLE INTEREST OR PLEASURE IN DOING THINGS: NOT AT ALL
2. FEELING DOWN, DEPRESSED OR HOPELESS: NOT AT ALL

## 2024-04-09 NOTE — PROGRESS NOTES
Tenzin Gaitan (: 1995) is a 29 y.o. male is here for evaluation of the following chief complaint(s): Follow-up (Routine follow up )    Assessment/Plan:   1. ADHD (attention deficit hyperactivity disorder), inattentive type  -     methylphenidate (CONCERTA) 36 MG extended release tablet; Take 2 tablets by mouth daily for 30 days. Max Daily Amount: 72 mg, Disp-60 tablet, R-0Normal  -     methylphenidate (CONCERTA) 36 MG extended release tablet; Take 2 tablets by mouth daily for 30 days. Max Daily Amount: 72 mg, Disp-60 tablet, R-0Normal  -     methylphenidate (CONCERTA) 36 MG extended release tablet; Take 2 tablets by mouth daily for 30 days. Max Daily Amount: 72 mg, Disp-60 tablet, R-0Normal    Return in about 3 months (around 2024) for adhd.  Subjective/Objective:   Since last visit: no change.  Medication compliance: all of the time.  Side effects from medication include: none.   has been reviewed.     A comprehensive review of systems was negative.      Exam:  General: alert, appears stated age, and cooperative  Heart exam: normal rate and regular rhythm, S1 and S2 normal, no murmurs noted  Lung exam: clear to auscultation bilaterally  Neuro: no gross deficits  Mental Status exam: normal mood, behavior, and thought processes, able to follow commands, good eye contact, fluent speech   /76 (Site: Left Upper Arm, Position: Sitting, Cuff Size: Large Adult)   Pulse 83   Ht 1.778 m (5' 10\")   Wt 87.1 kg (192 lb)   SpO2 96%   BMI 27.55 kg/m²       An electronic signature was used to authenticate this note.  -- Katie Hawk, APRN - CNP

## 2024-04-15 DIAGNOSIS — L55.9 SUNBURN: ICD-10-CM

## 2024-04-15 RX ORDER — MOMETASONE FUROATE 1 MG/G
CREAM TOPICAL DAILY PRN
Qty: 45 G | Refills: 0 | Status: SHIPPED | OUTPATIENT
Start: 2024-04-15

## 2024-06-11 ENCOUNTER — PATIENT MESSAGE (OUTPATIENT)
Dept: PRIMARY CARE CLINIC | Age: 29
End: 2024-06-11

## 2024-06-11 DIAGNOSIS — F90.0 ADHD (ATTENTION DEFICIT HYPERACTIVITY DISORDER), INATTENTIVE TYPE: ICD-10-CM

## 2024-06-12 RX ORDER — DEXMETHYLPHENIDATE HYDROCHLORIDE 10 MG/1
TABLET ORAL
Qty: 20 TABLET | Refills: 0 | Status: SHIPPED | OUTPATIENT
Start: 2024-06-12 | End: 2024-07-25

## 2024-06-12 NOTE — TELEPHONE ENCOUNTER
From: Tenzin Gaitan  To: Katie Hawk  Sent: 6/11/2024 9:37 PM EDT  Subject: Focalin    Would you please send a refill for my Focalin? Thank you!

## 2024-06-23 ENCOUNTER — PATIENT MESSAGE (OUTPATIENT)
Dept: PRIMARY CARE CLINIC | Age: 29
End: 2024-06-23

## 2024-06-23 DIAGNOSIS — F90.0 ADHD (ATTENTION DEFICIT HYPERACTIVITY DISORDER), INATTENTIVE TYPE: ICD-10-CM

## 2024-06-24 RX ORDER — METHYLPHENIDATE HYDROCHLORIDE 36 MG/1
72 TABLET ORAL DAILY
Qty: 60 TABLET | Refills: 0 | Status: SHIPPED | OUTPATIENT
Start: 2024-06-24 | End: 2024-07-24

## 2024-06-24 NOTE — TELEPHONE ENCOUNTER
From: Tenzin Gaitan  To: Katie Hawk  Sent: 6/23/2024 10:27 AM EDT  Subject: Concerta     [facility] gave me 5 days of Concerta and said that they are on back order and do not know when they will get any in stock. [facility] does have it but said that you would have to send the RX to them for the remainder. Thank you.

## 2024-07-18 ENCOUNTER — OFFICE VISIT (OUTPATIENT)
Dept: PRIMARY CARE CLINIC | Age: 29
End: 2024-07-18
Payer: COMMERCIAL

## 2024-07-18 VITALS
WEIGHT: 190.2 LBS | DIASTOLIC BLOOD PRESSURE: 85 MMHG | SYSTOLIC BLOOD PRESSURE: 130 MMHG | HEIGHT: 70 IN | OXYGEN SATURATION: 98 % | HEART RATE: 73 BPM | BODY MASS INDEX: 27.23 KG/M2

## 2024-07-18 DIAGNOSIS — F90.0 ADHD (ATTENTION DEFICIT HYPERACTIVITY DISORDER), INATTENTIVE TYPE: Primary | ICD-10-CM

## 2024-07-18 PROCEDURE — 99213 OFFICE O/P EST LOW 20 MIN: CPT | Performed by: NURSE PRACTITIONER

## 2024-07-18 RX ORDER — METHYLPHENIDATE HYDROCHLORIDE 54 MG/1
54 TABLET ORAL EVERY MORNING
Qty: 30 TABLET | Refills: 0 | Status: SHIPPED | OUTPATIENT
Start: 2024-07-18 | End: 2024-08-17

## 2024-07-18 NOTE — PROGRESS NOTES
Tenzin Gaitan (: 1995) is a 29 y.o. male is here for evaluation of the following chief complaint(s): Follow-up (3mo f/u) and ADHD (Trouble sleeping, possibly changing med?)    Assessment/Plan:   1. ADHD (attention deficit hyperactivity disorder), inattentive type  -     methylphenidate (CONCERTA) 54 MG extended release tablet; Take 1 tablet by mouth every morning for 30 days. Max Daily Amount: 54 mg, Disp-30 tablet, R-0Normal    Due to increased issues with sleep and patient's morning sedation with use of melatonin if he gets less than 5 to 6 hours of sleep agreed to lower dosing today of Concerta to 54 mg.  Close follow-up in 3 weeks to reevaluate effectiveness and ADHD control with dosing reduction    Return in about 3 weeks (around 2024).  Subjective/Objective:   Since last visit: no change.  Medication compliance: all of the time.  Side effects from medication include: insomnia.   has been reviewed.     A comprehensive review of systems was negative except for: Behavioral/Psych: positive for sleep disturbance . Can lie awake, no mind racing. Does work various shifts but this has lessened. Feels this has been an issue since high school, worse the last 3 month     Exam:  General: alert, appears stated age, and cooperative  Heart exam: normal rate, regular rhythm, normal S1, S2, no murmurs, rubs, clicks or gallops  Lung exam: clear to auscultation bilaterally  Neuro: no gross deficits  Mental Status exam: normal mood, behavior, and thought processes, able to follow commands   /85 (Site: Left Upper Arm, Position: Sitting, Cuff Size: Medium Adult)   Pulse 73   Ht 1.778 m (5' 10\")   Wt 86.3 kg (190 lb 3.2 oz)   SpO2 98%   BMI 27.29 kg/m²       An electronic signature was used to authenticate this note.  -- Katie Hawk, APRN - CNP

## 2024-08-08 ENCOUNTER — TELEMEDICINE (OUTPATIENT)
Dept: PRIMARY CARE CLINIC | Age: 29
End: 2024-08-08
Payer: COMMERCIAL

## 2024-08-08 DIAGNOSIS — F90.0 ADHD (ATTENTION DEFICIT HYPERACTIVITY DISORDER), INATTENTIVE TYPE: ICD-10-CM

## 2024-08-08 PROCEDURE — 99213 OFFICE O/P EST LOW 20 MIN: CPT | Performed by: NURSE PRACTITIONER

## 2024-08-08 RX ORDER — METHYLPHENIDATE HYDROCHLORIDE 54 MG/1
54 TABLET ORAL EVERY MORNING
Qty: 30 TABLET | Refills: 0 | Status: SHIPPED | OUTPATIENT
Start: 2024-08-08 | End: 2024-09-07

## 2024-08-08 NOTE — PROGRESS NOTES
Tenzin Gaitan is a 29 y.o. male evaluated virtual visit via [employer] on 8/8/2024.        Tenzin Gaitan was evaluated through a synchronous (real-time) audio encounter. Patient identification was verified at the start of the visit. He (or guardian if applicable) is aware that this is a billable service, which includes applicable co-pays. This visit was conducted with the patient's (and/or legal guardian's) verbal consent. He has not had a related appointment within my department in the past 7 days or scheduled within the next 24 hours.   The patient was located at Home: [address] [place] [address].  The provider was located at Facility ([facility] [place] [facility]): [address] [place] [address].    Note: not billable if this call serves to triage the patient into an appointment for the relevant concern  Yes, I confirm.   Tenzin Gaitan is a 29 y.o. male evaluated via telephone on 8/8/2024 for Follow-up (3 week follow up )  .        BREE Santiago CNP       Consent:  He and/or health care decision maker is aware that that he may receive a bill for this telephone service, depending on his insurance coverage, and has provided verbal consent to proceed: Yes      Documentation:  I communicated with the patient and/or health care decision maker about ADHD medication change.   Details of this discussion including any medical advice provided below      I affirm this is a Patient Initiated Episode with an Established Patient who has not had a related appointment within my department in the past 7 days or scheduled within the next 24 hours.    Total Time: minutes: 5-10 minutes    Note: not billable if this call serves to triage the patient into an appointment for the relevant concern      BREE Santiago CNP                  8/8/2024    TELEHEALTH EVALUATION -- Audio/Visual (During COVID-19 public health emergency)    Patient and physician are located in their individual

## 2024-09-05 ENCOUNTER — PATIENT MESSAGE (OUTPATIENT)
Dept: PRIMARY CARE CLINIC | Age: 29
End: 2024-09-05

## 2024-09-05 DIAGNOSIS — F90.0 ADHD (ATTENTION DEFICIT HYPERACTIVITY DISORDER), INATTENTIVE TYPE: ICD-10-CM

## 2024-09-06 RX ORDER — METHYLPHENIDATE HYDROCHLORIDE 54 MG/1
54 TABLET ORAL EVERY MORNING
Qty: 30 TABLET | Refills: 0 | Status: SHIPPED | OUTPATIENT
Start: 2024-09-06 | End: 2025-09-06

## 2024-09-09 DIAGNOSIS — F90.0 ADHD (ATTENTION DEFICIT HYPERACTIVITY DISORDER), INATTENTIVE TYPE: ICD-10-CM

## 2024-09-09 RX ORDER — DEXMETHYLPHENIDATE HYDROCHLORIDE 10 MG/1
TABLET ORAL
Qty: 20 TABLET | Refills: 0 | Status: SHIPPED | OUTPATIENT
Start: 2024-09-09 | End: 2024-11-05

## 2024-09-27 DIAGNOSIS — F90.0 ADHD (ATTENTION DEFICIT HYPERACTIVITY DISORDER), INATTENTIVE TYPE: ICD-10-CM

## 2024-09-27 RX ORDER — METHYLPHENIDATE HYDROCHLORIDE 54 MG/1
TABLET ORAL
Qty: 30 TABLET | Refills: 0 | Status: SHIPPED | OUTPATIENT
Start: 2024-09-27 | End: 2024-10-27

## 2024-09-27 RX ORDER — METHYLPHENIDATE HYDROCHLORIDE 36 MG/1
72 TABLET ORAL DAILY
Qty: 60 TABLET | Refills: 0 | OUTPATIENT
Start: 2024-09-27 | End: 2024-10-27

## 2024-11-15 ENCOUNTER — PATIENT MESSAGE (OUTPATIENT)
Dept: PRIMARY CARE CLINIC | Age: 29
End: 2024-11-15

## 2024-11-15 DIAGNOSIS — F90.0 ADHD (ATTENTION DEFICIT HYPERACTIVITY DISORDER), INATTENTIVE TYPE: ICD-10-CM

## 2024-11-18 RX ORDER — METHYLPHENIDATE HYDROCHLORIDE 54 MG/1
54 TABLET ORAL EVERY MORNING
Qty: 30 TABLET | Refills: 0 | Status: SHIPPED | OUTPATIENT
Start: 2024-11-18 | End: 2025-11-18

## 2024-11-27 ENCOUNTER — OFFICE VISIT (OUTPATIENT)
Dept: PRIMARY CARE CLINIC | Age: 29
End: 2024-11-27

## 2024-11-27 VITALS
BODY MASS INDEX: 26.8 KG/M2 | SYSTOLIC BLOOD PRESSURE: 127 MMHG | HEART RATE: 103 BPM | WEIGHT: 186.8 LBS | TEMPERATURE: 97.9 F | DIASTOLIC BLOOD PRESSURE: 86 MMHG | OXYGEN SATURATION: 99 %

## 2024-11-27 DIAGNOSIS — F90.0 ADHD, PREDOMINANTLY INATTENTIVE TYPE: Primary | ICD-10-CM

## 2024-11-27 DIAGNOSIS — Z23 NEED FOR INFLUENZA VACCINATION: ICD-10-CM

## 2024-11-27 RX ORDER — METHYLPHENIDATE HYDROCHLORIDE 54 MG/1
54 TABLET ORAL DAILY
Qty: 30 TABLET | Refills: 0 | Status: SHIPPED | OUTPATIENT
Start: 2024-12-27 | End: 2025-01-26

## 2024-11-27 RX ORDER — METHYLPHENIDATE HYDROCHLORIDE 54 MG/1
54 TABLET ORAL DAILY
Qty: 30 TABLET | Refills: 0 | Status: SHIPPED | OUTPATIENT
Start: 2024-11-27 | End: 2024-12-27

## 2024-11-27 RX ORDER — METHYLPHENIDATE HYDROCHLORIDE 54 MG/1
54 TABLET ORAL DAILY
Qty: 30 TABLET | Refills: 0 | Status: SHIPPED | OUTPATIENT
Start: 2025-01-26 | End: 2025-02-25

## 2024-11-27 NOTE — PROGRESS NOTES
Tenzin Gaitan (: 1995) is a 29 y.o. male is here for evaluation of the following chief complaint(s): ADHD    Assessment/Plan:     Assessment & Plan  ADHD, predominantly inattentive type   Chronic, at goal (stable), continue current treatment plan    Orders:    methylphenidate (CONCERTA) 54 MG extended release tablet; Take 1 tablet by mouth daily for 30 days. Max Daily Amount: 54 mg    methylphenidate (CONCERTA) 54 MG extended release tablet; Take 1 tablet by mouth daily for 30 days. Max Daily Amount: 54 mg    methylphenidate (CONCERTA) 54 MG extended release tablet; Take 1 tablet by mouth daily for 30 days. Max Daily Amount: 54 mg    Need for influenza vaccination       Orders:    Influenza, FLUCELVAX Trivalent, (age 6 mo+) IM, Preservative Free, 0.5mL    No follow-ups on file.  Subjective/Objective:   Since last visit: On methylphenidate 54 mg, down from 72 daily. feels symptoms are well-controlled with lower dose.  Medication compliance: all of the time.  Side effects from medication include: none.   has been reviewed.        /86 (Site: Left Upper Arm, Position: Sitting, Cuff Size: Medium Adult)   Pulse (!) 103   Temp 97.9 °F (36.6 °C) (Temporal)   Wt 84.7 kg (186 lb 12.8 oz)   SpO2 99%   BMI 26.80 kg/m²       An electronic signature was used to authenticate this note.  -- Katie Hawk, APRN - CNP

## 2025-01-16 ENCOUNTER — OFFICE VISIT (OUTPATIENT)
Dept: PRIMARY CARE CLINIC | Age: 30
End: 2025-01-16
Payer: COMMERCIAL

## 2025-01-16 VITALS
SYSTOLIC BLOOD PRESSURE: 114 MMHG | TEMPERATURE: 97.3 F | HEART RATE: 92 BPM | OXYGEN SATURATION: 100 % | DIASTOLIC BLOOD PRESSURE: 68 MMHG | BODY MASS INDEX: 27.41 KG/M2 | WEIGHT: 191 LBS

## 2025-01-16 DIAGNOSIS — F90.0 ADHD (ATTENTION DEFICIT HYPERACTIVITY DISORDER), INATTENTIVE TYPE: ICD-10-CM

## 2025-01-16 DIAGNOSIS — F90.0 ADHD, PREDOMINANTLY INATTENTIVE TYPE: Primary | ICD-10-CM

## 2025-01-16 PROCEDURE — 99213 OFFICE O/P EST LOW 20 MIN: CPT | Performed by: NURSE PRACTITIONER

## 2025-01-16 RX ORDER — DEXMETHYLPHENIDATE HYDROCHLORIDE 10 MG/1
TABLET ORAL
Qty: 20 TABLET | Refills: 0 | Status: SHIPPED | OUTPATIENT
Start: 2025-01-16 | End: 2025-03-14

## 2025-01-16 SDOH — ECONOMIC STABILITY: FOOD INSECURITY: WITHIN THE PAST 12 MONTHS, THE FOOD YOU BOUGHT JUST DIDN'T LAST AND YOU DIDN'T HAVE MONEY TO GET MORE.: NEVER TRUE

## 2025-01-16 SDOH — ECONOMIC STABILITY: FOOD INSECURITY: WITHIN THE PAST 12 MONTHS, YOU WORRIED THAT YOUR FOOD WOULD RUN OUT BEFORE YOU GOT MONEY TO BUY MORE.: NEVER TRUE

## 2025-01-16 ASSESSMENT — PATIENT HEALTH QUESTIONNAIRE - PHQ9
SUM OF ALL RESPONSES TO PHQ QUESTIONS 1-9: 0
SUM OF ALL RESPONSES TO PHQ9 QUESTIONS 1 & 2: 0
SUM OF ALL RESPONSES TO PHQ QUESTIONS 1-9: 0
SUM OF ALL RESPONSES TO PHQ QUESTIONS 1-9: 0
2. FEELING DOWN, DEPRESSED OR HOPELESS: NOT AT ALL
1. LITTLE INTEREST OR PLEASURE IN DOING THINGS: NOT AT ALL
SUM OF ALL RESPONSES TO PHQ QUESTIONS 1-9: 0

## 2025-01-16 NOTE — ASSESSMENT & PLAN NOTE
Chronic, at goal (stable), continue current treatment plan    Orders:    dexmethylphenidate (FOCALIN) 10 MG tablet; TAKE ONE TABLET BY MOUTH ONCE A DAY AS NEEDED FOR UP TO 30 DAYS

## 2025-01-16 NOTE — PROGRESS NOTES
Tenzin Gaitan (: 1995) is a 29 y.o. male is here for evaluation of the following chief complaint(s): ADHD    Assessment/Plan:     Assessment & Plan  ADHD (attention deficit hyperactivity disorder), inattentive type   Chronic, at goal (stable), continue current treatment plan    Orders:    dexmethylphenidate (FOCALIN) 10 MG tablet; TAKE ONE TABLET BY MOUTH ONCE A DAY AS NEEDED FOR UP TO 30 DAYS    Return in 3 months (on 2025).  Subjective/Objective:   Since last visit: no change.  Medication compliance: Concerta is taken daily and Focalin is used as needed depending on work shifts.  Tenzin is requesting refill of Focalin today and was told he needed a follow-up appointment  Side effects from medication include: none.   has been reviewed.     Exam:  General: alert, appears stated age, and cooperative  Neuro: no gross deficits   /68 (Site: Left Upper Arm, Position: Sitting, Cuff Size: Medium Adult)   Pulse 92   Temp 97.3 °F (36.3 °C) (Temporal)   Wt 86.6 kg (191 lb)   SpO2 100%   BMI 27.41 kg/m²       An electronic signature was used to authenticate this note.  -- Katie Hawk, APRN - CNP

## 2025-02-27 ENCOUNTER — PATIENT MESSAGE (OUTPATIENT)
Dept: PRIMARY CARE CLINIC | Age: 30
End: 2025-02-27

## 2025-02-27 DIAGNOSIS — F90.0 ADHD, PREDOMINANTLY INATTENTIVE TYPE: ICD-10-CM

## 2025-02-28 RX ORDER — METHYLPHENIDATE HYDROCHLORIDE 54 MG/1
54 TABLET ORAL DAILY
Qty: 30 TABLET | Refills: 0 | Status: SHIPPED | OUTPATIENT
Start: 2025-02-28 | End: 2025-03-30

## 2025-04-07 ENCOUNTER — OFFICE VISIT (OUTPATIENT)
Dept: PRIMARY CARE CLINIC | Age: 30
End: 2025-04-07
Payer: COMMERCIAL

## 2025-04-07 VITALS
OXYGEN SATURATION: 99 % | SYSTOLIC BLOOD PRESSURE: 139 MMHG | HEIGHT: 70 IN | BODY MASS INDEX: 26.63 KG/M2 | WEIGHT: 186 LBS | HEART RATE: 100 BPM | TEMPERATURE: 97.3 F | DIASTOLIC BLOOD PRESSURE: 83 MMHG

## 2025-04-07 DIAGNOSIS — G89.29 CHRONIC LEFT SHOULDER PAIN: ICD-10-CM

## 2025-04-07 DIAGNOSIS — M25.512 CHRONIC LEFT SHOULDER PAIN: ICD-10-CM

## 2025-04-07 DIAGNOSIS — F90.0 ADHD, PREDOMINANTLY INATTENTIVE TYPE: Primary | ICD-10-CM

## 2025-04-07 PROCEDURE — 99214 OFFICE O/P EST MOD 30 MIN: CPT | Performed by: NURSE PRACTITIONER

## 2025-04-07 RX ORDER — METHYLPHENIDATE HYDROCHLORIDE 54 MG/1
54 TABLET ORAL DAILY
Qty: 30 TABLET | Refills: 0 | Status: SHIPPED | OUTPATIENT
Start: 2025-06-06 | End: 2025-07-06

## 2025-04-07 RX ORDER — METHYLPHENIDATE HYDROCHLORIDE 54 MG/1
54 TABLET ORAL DAILY
Qty: 30 TABLET | Refills: 0 | Status: SHIPPED | OUTPATIENT
Start: 2025-04-07 | End: 2025-04-10 | Stop reason: SDUPTHER

## 2025-04-07 RX ORDER — METHYLPHENIDATE HYDROCHLORIDE 54 MG/1
54 TABLET ORAL DAILY
Qty: 30 TABLET | Refills: 0 | Status: SHIPPED | OUTPATIENT
Start: 2025-05-07 | End: 2025-06-06

## 2025-04-07 NOTE — PROGRESS NOTES
days. Max Daily Amount: 54 mg    Chronic left shoulder pain   Chronic, not at goal (unstable), provided home exercises as well as PT referral, cervical x-ray of the spine in March 23 demonstrated no abnormalities    Orders:    [facility]    No follow-ups on file.  Subjective/Objective:   Since last visit: no change.  Medication compliance: all of the time.  Side effects from medication include: none.   has been reviewed.     Pertinent items are noted in HPI.      Exam:  General: alert, appears stated age, and cooperative  Heart exam: normal rate, regular rhythm, normal S1, S2, no murmurs, rubs, clicks or gallops  Lung exam: clear to auscultation bilaterally  Neuro: no gross deficits  Mental Status exam: normal mood, behavior, and thought processes, able to follow commands   There were no vitals taken for this visit.      An electronic signature was used to authenticate this note.  -- Katie Hawk, APRN - CNP

## 2025-04-10 ENCOUNTER — PATIENT MESSAGE (OUTPATIENT)
Dept: PRIMARY CARE CLINIC | Age: 30
End: 2025-04-10

## 2025-04-10 ENCOUNTER — TELEPHONE (OUTPATIENT)
Dept: PRIMARY CARE CLINIC | Age: 30
End: 2025-04-10

## 2025-04-10 DIAGNOSIS — F90.0 ADHD, PREDOMINANTLY INATTENTIVE TYPE: ICD-10-CM

## 2025-04-10 RX ORDER — METHYLPHENIDATE HYDROCHLORIDE 54 MG/1
54 TABLET ORAL DAILY
Qty: 30 TABLET | Refills: 0 | Status: SHIPPED | OUTPATIENT
Start: 2025-05-10 | End: 2025-06-09

## 2025-04-10 RX ORDER — METHYLPHENIDATE HYDROCHLORIDE 54 MG/1
54 TABLET ORAL DAILY
Qty: 30 TABLET | Refills: 0 | Status: SHIPPED | OUTPATIENT
Start: 2025-06-10 | End: 2025-07-10

## 2025-04-10 RX ORDER — METHYLPHENIDATE HYDROCHLORIDE 54 MG/1
54 TABLET ORAL DAILY
Qty: 30 TABLET | Refills: 0 | Status: SHIPPED | OUTPATIENT
Start: 2025-04-10 | End: 2025-05-10

## 2025-05-07 ENCOUNTER — PATIENT MESSAGE (OUTPATIENT)
Dept: PRIMARY CARE CLINIC | Age: 30
End: 2025-05-07

## 2025-05-07 DIAGNOSIS — F90.0 ADHD (ATTENTION DEFICIT HYPERACTIVITY DISORDER), INATTENTIVE TYPE: ICD-10-CM

## 2025-05-07 RX ORDER — DEXMETHYLPHENIDATE HYDROCHLORIDE 10 MG/1
TABLET ORAL
Qty: 20 TABLET | Refills: 0 | Status: SHIPPED | OUTPATIENT
Start: 2025-05-07 | End: 2025-07-03

## 2025-05-17 ENCOUNTER — PATIENT MESSAGE (OUTPATIENT)
Dept: PRIMARY CARE CLINIC | Age: 30
End: 2025-05-17

## 2025-05-17 DIAGNOSIS — F90.0 ADHD, PREDOMINANTLY INATTENTIVE TYPE: ICD-10-CM

## 2025-05-19 RX ORDER — METHYLPHENIDATE HYDROCHLORIDE 54 MG/1
54 TABLET ORAL DAILY
Qty: 30 TABLET | Refills: 0 | Status: SHIPPED | OUTPATIENT
Start: 2025-05-19 | End: 2025-06-18

## 2025-05-19 NOTE — TELEPHONE ENCOUNTER
Called over to [facility]. They do not have the prescriptions for the May or June for Concerta.     Pended for May.

## 2025-07-07 ENCOUNTER — OFFICE VISIT (OUTPATIENT)
Dept: PRIMARY CARE CLINIC | Age: 30
End: 2025-07-07
Payer: COMMERCIAL

## 2025-07-07 VITALS
TEMPERATURE: 97 F | DIASTOLIC BLOOD PRESSURE: 79 MMHG | HEART RATE: 85 BPM | WEIGHT: 170 LBS | OXYGEN SATURATION: 100 % | SYSTOLIC BLOOD PRESSURE: 123 MMHG | BODY MASS INDEX: 24.34 KG/M2 | HEIGHT: 70 IN

## 2025-07-07 DIAGNOSIS — F90.0 ADHD, PREDOMINANTLY INATTENTIVE TYPE: Primary | ICD-10-CM

## 2025-07-07 PROCEDURE — 99213 OFFICE O/P EST LOW 20 MIN: CPT | Performed by: NURSE PRACTITIONER

## 2025-07-07 RX ORDER — METHYLPHENIDATE HYDROCHLORIDE 54 MG/1
54 TABLET ORAL DAILY
Qty: 30 TABLET | Refills: 0 | Status: SHIPPED | OUTPATIENT
Start: 2025-09-05 | End: 2025-10-05

## 2025-07-07 RX ORDER — METHYLPHENIDATE HYDROCHLORIDE 54 MG/1
54 TABLET ORAL DAILY
Qty: 30 TABLET | Refills: 0 | Status: SHIPPED | OUTPATIENT
Start: 2025-07-07 | End: 2025-08-06

## 2025-07-07 RX ORDER — METHYLPHENIDATE HYDROCHLORIDE 54 MG/1
54 TABLET ORAL DAILY
Qty: 30 TABLET | Refills: 0 | Status: SHIPPED | OUTPATIENT
Start: 2025-08-06 | End: 2025-09-05

## 2025-07-07 NOTE — PROGRESS NOTES
eTnzin Gaitan (: 1995) is a 30 y.o. male is here for evaluation of the following chief complaint(s): Follow-up    Assessment/Plan:     History of Present Illness   The patient is a 30-year-old male here today for a routine follow-up for his ADHD. He was last seen in the office on 2025. Current treatment includes methylphenidate 54 mg extended release and Focalin 10 mg as needed.    Persistent neck pain is reported as improved,  localized to the left side of the cervical spine. The pain occasionally radiates to the base of the skull, similar to a previous episode that required physical therapy. Fatigue upon waking is noted, regardless of the duration of sleep, a symptom present since high school years. No associated numbness or tingling in the arm is reported.     Medication is taken on workdays or when focus is needed. He has 3 to 4 pills left. A vacation for a week and a half was taken.           Assessment & Plan  ADHD, predominantly inattentive type   Chronic, at goal (stable), continue current treatment plan    Orders:    methylphenidate (CONCERTA) 54 MG extended release tablet; Take 1 tablet by mouth daily for 30 days. Max Daily Amount: 54 mg    methylphenidate (CONCERTA) 54 MG extended release tablet; Take 1 tablet by mouth daily for 30 days. Max Daily Amount: 54 mg    methylphenidate (CONCERTA) 54 MG extended release tablet; Take 1 tablet by mouth daily for 30 days. Max Daily Amount: 54 mg    Return in 3 months (on 10/7/2025).  Subjective/Objective:   Since last visit: no change.  Medication compliance: all of the time.  Side effects from medication include: none.   has been reviewed.     Pertinent items are noted in HPI.      Exam:  General: alert, appears stated age, and cooperative  Heart exam: normal rate, regular rhythm, normal S1, S2, no murmurs, rubs, clicks or gallops  Lung exam: clear to auscultation bilaterally  Neuro: no gross deficits  Mental Status exam: normal mood, behavior, and